# Patient Record
Sex: FEMALE | Race: WHITE | Employment: OTHER | URBAN - METROPOLITAN AREA
[De-identification: names, ages, dates, MRNs, and addresses within clinical notes are randomized per-mention and may not be internally consistent; named-entity substitution may affect disease eponyms.]

---

## 2018-02-12 ENCOUNTER — OFFICE VISIT (OUTPATIENT)
Dept: FAMILY MEDICINE CLINIC | Facility: CLINIC | Age: 63
End: 2018-02-12

## 2018-02-12 VITALS
TEMPERATURE: 98 F | SYSTOLIC BLOOD PRESSURE: 122 MMHG | BODY MASS INDEX: 39.54 KG/M2 | DIASTOLIC BLOOD PRESSURE: 80 MMHG | HEIGHT: 66.5 IN | HEART RATE: 64 BPM | WEIGHT: 249 LBS | RESPIRATION RATE: 16 BRPM | OXYGEN SATURATION: 98 %

## 2018-02-12 DIAGNOSIS — Z12.11 COLON CANCER SCREENING: ICD-10-CM

## 2018-02-12 DIAGNOSIS — K57.92 DIVERTICULITIS: ICD-10-CM

## 2018-02-12 DIAGNOSIS — E66.09 CLASS 2 OBESITY DUE TO EXCESS CALORIES WITHOUT SERIOUS COMORBIDITY WITH BODY MASS INDEX (BMI) OF 39.0 TO 39.9 IN ADULT: ICD-10-CM

## 2018-02-12 DIAGNOSIS — Z13.29 SCREENING FOR ENDOCRINE, NUTRITIONAL, METABOLIC AND IMMUNITY DISORDER: ICD-10-CM

## 2018-02-12 DIAGNOSIS — M25.561 CHRONIC PAIN OF RIGHT KNEE: ICD-10-CM

## 2018-02-12 DIAGNOSIS — M41.9 SCOLIOSIS, UNSPECIFIED SCOLIOSIS TYPE, UNSPECIFIED SPINAL REGION: Primary | ICD-10-CM

## 2018-02-12 DIAGNOSIS — Z13.21 SCREENING FOR ENDOCRINE, NUTRITIONAL, METABOLIC AND IMMUNITY DISORDER: ICD-10-CM

## 2018-02-12 DIAGNOSIS — F43.10 PTSD (POST-TRAUMATIC STRESS DISORDER): ICD-10-CM

## 2018-02-12 DIAGNOSIS — Z13.228 SCREENING FOR ENDOCRINE, NUTRITIONAL, METABOLIC AND IMMUNITY DISORDER: ICD-10-CM

## 2018-02-12 DIAGNOSIS — G89.29 CHRONIC PAIN OF RIGHT KNEE: ICD-10-CM

## 2018-02-12 DIAGNOSIS — Z13.0 SCREENING FOR ENDOCRINE, NUTRITIONAL, METABOLIC AND IMMUNITY DISORDER: ICD-10-CM

## 2018-02-12 PROCEDURE — 99204 OFFICE O/P NEW MOD 45 MIN: CPT | Performed by: FAMILY MEDICINE

## 2018-02-12 NOTE — PATIENT INSTRUCTIONS
[address]  [facility] [address]  provides outpatient community mental health services for

## 2018-02-12 NOTE — PROGRESS NOTES
[facility] Family Medicine Office Note  Chief Complaint:   Patient presents with:  Establish Care: NP estab care, needs referral for ortho and gastro       HPI:   This is a 65year old female coming in for  HPI  1.  Scoliosis  Was working with PT. OF SYSTEMS:   Review of Systems   Constitutional: Negative for chills and fever. HENT: Negative for rhinorrhea and sinus pressure. Eyes: Negative for pain and visual disturbance. Respiratory: Negative for cough and shortness of breath.     Cardiovasc no swelling and no effusion. Thoracic back: She exhibits pain. She exhibits normal range of motion. Deformity: lateral curvature to right. Lymphadenopathy:     She has no cervical adenopathy.    Neurological: She is alert and oriented to person, pl Scoliosis     Diverticulitis     PTSD (post-traumatic stress disorder)     Chronic pain of right knee     Class 2 obesity due to excess calories without serious comorbidity with body mass index (BMI) of 39.0 to 39.9 in adult

## 2018-02-14 ENCOUNTER — TELEPHONE (OUTPATIENT)
Dept: FAMILY MEDICINE CLINIC | Facility: CLINIC | Age: 63
End: 2018-02-14

## 2018-05-30 ENCOUNTER — TELEPHONE (OUTPATIENT)
Dept: FAMILY MEDICINE CLINIC | Facility: CLINIC | Age: 63
End: 2018-05-30

## 2018-05-30 DIAGNOSIS — M41.9 SCOLIOSIS, UNSPECIFIED SCOLIOSIS TYPE, UNSPECIFIED SPINAL REGION: Primary | ICD-10-CM

## 2018-05-31 ENCOUNTER — TELEPHONE (OUTPATIENT)
Dept: PHYSICAL THERAPY | Age: 63
End: 2018-05-31

## 2018-06-04 ENCOUNTER — APPOINTMENT (OUTPATIENT)
Dept: PHYSICAL THERAPY | Age: 63
End: 2018-06-04
Attending: FAMILY MEDICINE

## 2018-06-06 ENCOUNTER — OFFICE VISIT (OUTPATIENT)
Dept: PHYSICAL THERAPY | Age: 63
End: 2018-06-06
Attending: PHYSICIAN ASSISTANT
Payer: MEDICAID

## 2018-06-06 PROCEDURE — 97162 PT EVAL MOD COMPLEX 30 MIN: CPT | Performed by: PHYSICAL THERAPIST

## 2018-06-06 PROCEDURE — 97530 THERAPEUTIC ACTIVITIES: CPT | Performed by: PHYSICAL THERAPIST

## 2018-06-19 ENCOUNTER — OFFICE VISIT (OUTPATIENT)
Dept: PHYSICAL THERAPY | Age: 63
End: 2018-06-19
Attending: PHYSICIAN ASSISTANT
Payer: MEDICAID

## 2018-06-19 PROCEDURE — 97140 MANUAL THERAPY 1/> REGIONS: CPT | Performed by: PHYSICAL THERAPIST

## 2018-06-19 PROCEDURE — 97110 THERAPEUTIC EXERCISES: CPT | Performed by: PHYSICAL THERAPIST

## 2018-06-19 NOTE — PROGRESS NOTES
Dx: Thoracic pain, scoliosis         Authorized # of Visits:  8 visits by 7/11/2018         Next MD visit: none scheduled  Fall Risk: standard         Precautions: n/a             Subjective: Pt states she has not noticed any significant changes in symptom MINS                                          Charges:  There ex X 2, man there X 1       Total Timed Treatment: 45 min  Total Treatment Time: 45 min

## 2018-06-26 ENCOUNTER — OFFICE VISIT (OUTPATIENT)
Dept: PHYSICAL THERAPY | Age: 63
End: 2018-06-26
Attending: PHYSICIAN ASSISTANT
Payer: MEDICAID

## 2018-06-26 PROCEDURE — 97110 THERAPEUTIC EXERCISES: CPT | Performed by: PHYSICAL THERAPIST

## 2018-06-26 PROCEDURE — 97140 MANUAL THERAPY 1/> REGIONS: CPT | Performed by: PHYSICAL THERAPIST

## 2018-06-26 NOTE — PROGRESS NOTES
Dx: Thoracic pain, scoliosis         Authorized # of Visits:  8 visits by 7/11/2018         Next MD visit: none scheduled  Fall Risk: standard         Precautions: n/a             Subjective: Pt states she has noticed significant improvement with back pain Charges:  There ex X 2, man there X 1       Total Timed Treatment: 45 min  Total Treatment Time: 45 min

## 2018-07-03 ENCOUNTER — OFFICE VISIT (OUTPATIENT)
Dept: PHYSICAL THERAPY | Age: 63
End: 2018-07-03
Attending: PHYSICIAN ASSISTANT
Payer: MEDICAID

## 2018-07-03 PROCEDURE — 97110 THERAPEUTIC EXERCISES: CPT

## 2018-07-03 PROCEDURE — 97140 MANUAL THERAPY 1/> REGIONS: CPT

## 2018-07-03 NOTE — PROGRESS NOTES
Dx: Thoracic pain, scoliosis         Authorized # of Visits:  8 visits by 7/11/2018         Next MD visit: none scheduled  Fall Risk: standard         Precautions: n/a             Subjective:  The patient reports she is feeling much better after attending t MAN THERE 15 MINS MAN THERE 15 MINS        STM to R parascapular mm STM to R parascapular mm Grade III T3-T4 L unilaterally 30x lateral glides T1-4                                   MINS                   MINS

## 2018-07-10 ENCOUNTER — APPOINTMENT (OUTPATIENT)
Dept: PHYSICAL THERAPY | Age: 63
End: 2018-07-10
Attending: PHYSICIAN ASSISTANT
Payer: MEDICAID

## 2018-07-16 ENCOUNTER — APPOINTMENT (OUTPATIENT)
Dept: PHYSICAL THERAPY | Age: 63
End: 2018-07-16
Attending: PHYSICIAN ASSISTANT
Payer: MEDICAID

## 2018-07-24 ENCOUNTER — APPOINTMENT (OUTPATIENT)
Dept: PHYSICAL THERAPY | Age: 63
End: 2018-07-24
Attending: PHYSICIAN ASSISTANT
Payer: MEDICAID

## 2018-07-31 ENCOUNTER — APPOINTMENT (OUTPATIENT)
Dept: PHYSICAL THERAPY | Age: 63
End: 2018-07-31
Attending: PHYSICIAN ASSISTANT
Payer: MEDICAID

## 2018-08-07 ENCOUNTER — APPOINTMENT (OUTPATIENT)
Dept: PHYSICAL THERAPY | Age: 63
End: 2018-08-07
Attending: PHYSICIAN ASSISTANT
Payer: MEDICAID

## 2018-11-21 NOTE — TELEPHONE ENCOUNTER
----- Message from Clif Bravo PTA sent at 5/30/2018  3:02 PM CDT -----  Regarding: Updated Order needed   Dr Bobby Downey    Your patient called to schedule PT for the orders you wrote in February . Her insurance requires updated current orders .     If
Order placed
generalized

## 2019-01-01 ENCOUNTER — EXTERNAL RECORD (OUTPATIENT)
Dept: HEALTH INFORMATION MANAGEMENT | Facility: OTHER | Age: 64
End: 2019-01-01

## 2019-01-07 ENCOUNTER — OFFICE VISIT (OUTPATIENT)
Dept: FAMILY MEDICINE CLINIC | Facility: CLINIC | Age: 64
End: 2019-01-07
Payer: MEDICAID

## 2019-01-07 VITALS
TEMPERATURE: 98 F | SYSTOLIC BLOOD PRESSURE: 142 MMHG | HEIGHT: 66.5 IN | WEIGHT: 264 LBS | RESPIRATION RATE: 16 BRPM | HEART RATE: 87 BPM | BODY MASS INDEX: 41.93 KG/M2 | OXYGEN SATURATION: 97 % | DIASTOLIC BLOOD PRESSURE: 90 MMHG

## 2019-01-07 DIAGNOSIS — R06.02 SHORTNESS OF BREATH: ICD-10-CM

## 2019-01-07 DIAGNOSIS — R53.83 OTHER FATIGUE: ICD-10-CM

## 2019-01-07 DIAGNOSIS — R05.9 COUGH: Primary | ICD-10-CM

## 2019-01-07 PROCEDURE — 99214 OFFICE O/P EST MOD 30 MIN: CPT | Performed by: FAMILY MEDICINE

## 2019-01-07 NOTE — PROGRESS NOTES
[address] [facility] Family Medicine Office Note  Chief Complaint:   Patient presents with:  ER F/U: [facility] 01/01/19, f/u exposure to mold, dizziness, poor appetite, fatigue      HPI:   This is a 59year old female coming in for  HPI    The patient pres Not Answered       REVIEW OF SYSTEMS:   Review of Systems   Constitutional: Positive for appetite change and fatigue. Negative for chills and fever. HENT: Negative for rhinorrhea and sinus pressure. Eyes: Negative for pain and visual disturbance.    Re exposure  Start with sputum culture  May need CT chest to eval for fungal infection or persistent sx  Recommend patient not return to apartment until suspicious material can be tested  Should contact [facility] dept for further instructions on testing o

## 2019-01-08 ENCOUNTER — TELEPHONE (OUTPATIENT)
Dept: FAMILY MEDICINE CLINIC | Facility: CLINIC | Age: 64
End: 2019-01-08

## 2019-01-08 NOTE — TELEPHONE ENCOUNTER
Patient signed medical records authorization form for the below Facility to disclose health information to [facility]:      Facility / Provider Name: [facility] Phone:    Facility Fax: [phone]     JET sent to scanning.  Fax confirmat

## 2019-01-09 ENCOUNTER — TELEPHONE (OUTPATIENT)
Dept: FAMILY MEDICINE CLINIC | Facility: CLINIC | Age: 64
End: 2019-01-09

## 2019-01-09 NOTE — TELEPHONE ENCOUNTER
Yesterday the patient bought high performance air purifier and ran it for 30 minutes and is amazed. No burning in mouth, coughing stopped. Horrible feeling patient had has now completely stopped.  Last night was the first time the patient slept without

## 2019-01-09 NOTE — TELEPHONE ENCOUNTER
Does not need to get CT scan unless sx worsen/return  If there is no sputum - no culture can be obtained - ok to cancel

## 2019-01-09 NOTE — TELEPHONE ENCOUNTER
Pt repeated all of the info below. Pt is using a [employer] high performance air purifier that has 2 filters-a HEPA filter and a ionizer. Also states she has no sputum to cough up at this time so she will not be able to do the sputum culture. Feeling so much

## 2019-01-11 ENCOUNTER — MED REC SCAN ONLY (OUTPATIENT)
Dept: FAMILY MEDICINE CLINIC | Facility: CLINIC | Age: 64
End: 2019-01-11

## 2019-01-14 ENCOUNTER — TELEPHONE (OUTPATIENT)
Dept: FAMILY MEDICINE CLINIC | Facility: CLINIC | Age: 64
End: 2019-01-14

## 2019-01-14 NOTE — TELEPHONE ENCOUNTER
The CT scan was denied, however, pt didn't want to complete it anyway because her symptoms improved with the air purifier.

## 2019-01-14 NOTE — TELEPHONE ENCOUNTER
Below message received from referral dept regarding patient's CT chest:    [facility] Clinical Staff             Good morning,     Per fax vd CPT code 85007 did not meet medical necessity & has been denied, Physician can call [phone]

## 2019-01-17 RX ORDER — ALBUTEROL SULFATE 90 UG/1
1 AEROSOL, METERED RESPIRATORY (INHALATION) EVERY 6 HOURS PRN
Qty: 1 INHALER | Refills: 1 | Status: CANCELLED | OUTPATIENT
Start: 2019-01-17

## 2019-01-21 ENCOUNTER — TELEPHONE (OUTPATIENT)
Dept: FAMILY MEDICINE CLINIC | Facility: CLINIC | Age: 64
End: 2019-01-21
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
## 2019-01-21 NOTE — TELEPHONE ENCOUNTER
Pt aware of Dr Verenice Sims response. She states she thinks she has a bacterial infection. She discribes it as a stye but states she has had one before and its not the same. She states it keeps popping up in different spots/eyes.   I made an appt for her for We

## 2019-01-21 NOTE — TELEPHONE ENCOUNTER
Albuterol HFA prn, mucinex for chest congestion. Daily women's multivitamin.  If mold is treated - sx should resolve
Called to clarify the treatment of the mold. Pt states the e-mail confirms there is mold but it did not states \"black mold\" or specify what type of mold. The housing authority will be moving her out to repair/paint/caulk the area but pt is unsure when.
Patient called to let us know that her apartment was tested and it tested positive for black mold. The patient stated she has bad mind fog and exhausted and tired all the time.  The patient is wondering if there is something she should be doing to radha relie
See 1/21/19 TE.
Spoke to pt about recommendations below. She verbalizes understanding, but does not want the albuterol inhaler. She states she is supposed to be moved out of her housing, while the mold is repaired.  She has a 24 hour air purifier and in the meantime has be
70

## 2019-01-21 NOTE — TELEPHONE ENCOUNTER
I don't know what is wrong with her eye - discharge?    For irritation - otc eye wetting drops like visine  Warm compress

## 2019-01-23 ENCOUNTER — OFFICE VISIT (OUTPATIENT)
Dept: FAMILY MEDICINE CLINIC | Facility: CLINIC | Age: 64
End: 2019-01-23
Payer: MEDICAID

## 2019-01-23 VITALS
DIASTOLIC BLOOD PRESSURE: 86 MMHG | RESPIRATION RATE: 16 BRPM | SYSTOLIC BLOOD PRESSURE: 142 MMHG | HEART RATE: 78 BPM | HEIGHT: 66.5 IN | TEMPERATURE: 98 F | OXYGEN SATURATION: 97 % | BODY MASS INDEX: 41.93 KG/M2 | WEIGHT: 264 LBS

## 2019-01-23 DIAGNOSIS — H00.011 HORDEOLUM EXTERNUM OF RIGHT UPPER EYELID: Primary | ICD-10-CM

## 2019-01-23 PROCEDURE — 99213 OFFICE O/P EST LOW 20 MIN: CPT | Performed by: FAMILY MEDICINE

## 2019-01-23 RX ORDER — ERYTHROMYCIN 5 MG/G
1 OINTMENT OPHTHALMIC EVERY 6 HOURS
Qty: 3.5 G | Refills: 0 | Status: SHIPPED | OUTPATIENT
Start: 2019-01-23 | End: 2019-01-23

## 2019-01-23 RX ORDER — ERYTHROMYCIN 5 MG/G
1 OINTMENT OPHTHALMIC EVERY 6 HOURS
Qty: 3.5 G | Refills: 0 | Status: SHIPPED | OUTPATIENT
Start: 2019-01-23 | End: 2019-01-30

## 2019-01-23 NOTE — PROGRESS NOTES
[facility] Family Medicine Office Note  Chief Complaint:   Patient presents with:  Eyelid Swelling: x1 week, right eye, white pus/redness      HPI:   This is a 59year old female coming in for  Eye Problem    The right eye is affected.  This is a Application into the right eye every 6 (six) hours for 7 days. Disp: 3.5 g Rfl: 0      Counseling given: Not Answered       REVIEW OF SYSTEMS:   Review of Systems   Constitutional: Negative for chills and fever.    HENT: Negative for rhinorrhea and sinus pr worsen or fail to improve. Meds & Refills for this Visit:  Requested Prescriptions     Signed Prescriptions Disp Refills   • erythromycin 5 MG/GM Ophthalmic Ointment 3.5 g 0     Sig: Place 1 Application into the right eye every 6 (six) hours for 7 days.

## 2019-01-23 NOTE — PATIENT INSTRUCTIONS
Sty (or Stye)  A sty is an infection of the oil gland of the eyelid. It may develop into a small pocket of pus (an abscess). This can cause pain, redness, and swelling.  In early stages, a sty is treated with antibiotic cream, eye drops, or a small towel © 4473-1714 [employer] [address] [facility], [facility] [place]. All rights reserved. This information is not intended as a substitute for professional medical care. Always follow your healthcare professional's instructions.

## 2019-02-04 ENCOUNTER — PATIENT OUTREACH (OUTPATIENT)
Dept: FAMILY MEDICINE CLINIC | Facility: CLINIC | Age: 64
End: 2019-02-04

## 2019-02-05 ENCOUNTER — TELEPHONE (OUTPATIENT)
Dept: FAMILY MEDICINE CLINIC | Facility: CLINIC | Age: 64
End: 2019-02-05

## 2019-02-05 NOTE — TELEPHONE ENCOUNTER
Patient Mike Mendoza called to let provider know that she moved to a different apartment and her eye infection and scratchy throat and other allergy type issues seem to be resolving, she believes there was mold in the old apartment that was causing her problems.

## 2019-02-15 ENCOUNTER — OFFICE VISIT (OUTPATIENT)
Dept: FAMILY MEDICINE CLINIC | Facility: CLINIC | Age: 64
End: 2019-02-15
Payer: MEDICAID

## 2019-02-15 VITALS
SYSTOLIC BLOOD PRESSURE: 130 MMHG | WEIGHT: 264 LBS | OXYGEN SATURATION: 97 % | DIASTOLIC BLOOD PRESSURE: 86 MMHG | HEART RATE: 63 BPM | HEIGHT: 66.5 IN | RESPIRATION RATE: 16 BRPM | BODY MASS INDEX: 41.93 KG/M2 | TEMPERATURE: 98 F

## 2019-02-15 DIAGNOSIS — H01.02A SQUAMOUS BLEPHARITIS OF BOTH UPPER AND LOWER EYELID OF RIGHT EYE: ICD-10-CM

## 2019-02-15 DIAGNOSIS — H57.89 IRRITATION OF RIGHT EYE: Primary | ICD-10-CM

## 2019-02-15 PROCEDURE — 99213 OFFICE O/P EST LOW 20 MIN: CPT | Performed by: FAMILY MEDICINE

## 2019-02-15 NOTE — PROGRESS NOTES
[address] [facility] Family Medicine Office Note  Chief Complaint:   Patient presents with:  Eyelid Swelling: f/u, bump is still current on the right eye, no discharge      HPI:   This is a 59year old female coming in for  HPI    Eye concern  Has irritati SpO2 97%   BMI 41.97 kg/m²  Estimated body mass index is 41.97 kg/m² as calculated from the following:    Height as of this encounter: 66.5\". Weight as of this encounter: 264 lb. Vital signs reviewed. Appears stated age, well groomed.   Physical Exam knee     Class 2 obesity due to excess calories without serious comorbidity with body mass index (BMI) of 39.0 to 39.9 in adult

## 2019-02-15 NOTE — PATIENT INSTRUCTIONS
Treating Blepharitis: Self-Care    To treat the problem, keep your eyelids clean. Warm compresses can reduce redness and swelling, and help clean your eyelids, too. You may also need to wash the area gently with an eyelid scrub when you wake up.   To appl

## 2019-03-28 NOTE — TELEPHONE ENCOUNTER
Patient left a message requesting a refill for erythromycin 5 MG/GM Ophthalmic Ointment. Patient said she keeps getting eye infections from her allergies.  Patient said she would like this get refilled at [facility] in [place] on [address].

## 2019-03-28 NOTE — TELEPHONE ENCOUNTER
Spoke to pt - she states that her left eye had swelling, mucous, and was watery. She states it's from her environmental allergies/pollen.  She states she had used the Erythromycin 5mg/gm ophthalmic ointment from her 1/23/19 visit (tx of R eye irritation) an

## 2019-03-29 RX ORDER — ERYTHROMYCIN 5 MG/G
1 OINTMENT OPHTHALMIC EVERY 6 HOURS
Qty: 3.5 G | Refills: 0 | OUTPATIENT
Start: 2019-03-29 | End: 2019-04-05

## 2019-03-30 NOTE — TELEPHONE ENCOUNTER
Allergic conjunctivitis not treated with abx - but need to consider pataday opth gtt or oral antihistamine

## 2019-04-01 NOTE — TELEPHONE ENCOUNTER
Pt informed. I offered an appt, offered to send Pataday but stated it may not be covered. Pt given a otc option-[place] eye drops and instructed to use a oral antihistamine. Call if no better. Dr Leandro Bartlett was informed and is ok with the plan.

## 2019-11-22 ENCOUNTER — TELEPHONE (OUTPATIENT)
Dept: SCHEDULING | Age: 64
End: 2019-11-22

## 2019-12-04 ENCOUNTER — TELEPHONE (OUTPATIENT)
Dept: FAMILY MEDICINE CLINIC | Facility: CLINIC | Age: 64
End: 2019-12-04

## 2020-01-01 ENCOUNTER — EXTERNAL RECORD (OUTPATIENT)
Dept: HEALTH INFORMATION MANAGEMENT | Facility: OTHER | Age: 65
End: 2020-01-01

## 2020-02-19 ENCOUNTER — TELEPHONE (OUTPATIENT)
Dept: SCHEDULING | Age: 65
End: 2020-02-19

## 2020-02-19 ENCOUNTER — TELEPHONE (OUTPATIENT)
Dept: FAMILY MEDICINE | Age: 65
End: 2020-02-19

## 2020-02-20 ENCOUNTER — TELEPHONE (OUTPATIENT)
Dept: SCHEDULING | Age: 65
End: 2020-02-20

## 2020-02-24 ENCOUNTER — APPOINTMENT (OUTPATIENT)
Dept: FAMILY MEDICINE | Age: 65
End: 2020-02-24

## 2020-02-27 ENCOUNTER — APPOINTMENT (OUTPATIENT)
Dept: FAMILY MEDICINE | Age: 65
End: 2020-02-27

## 2020-02-29 ENCOUNTER — TELEPHONE (OUTPATIENT)
Dept: SCHEDULING | Age: 65
End: 2020-02-29

## 2020-03-05 ENCOUNTER — APPOINTMENT (OUTPATIENT)
Dept: FAMILY MEDICINE | Age: 65
End: 2020-03-05

## 2020-06-23 ENCOUNTER — TELEPHONE (OUTPATIENT)
Dept: SCHEDULING | Age: 65
End: 2020-06-23

## 2020-06-24 ENCOUNTER — TELEPHONE (OUTPATIENT)
Dept: SCHEDULING | Age: 65
End: 2020-06-24

## 2020-06-26 ENCOUNTER — APPOINTMENT (OUTPATIENT)
Dept: FAMILY MEDICINE | Age: 65
End: 2020-06-26

## 2020-08-07 ENCOUNTER — TELEPHONE (OUTPATIENT)
Dept: SCHEDULING | Age: 65
End: 2020-08-07

## 2020-08-07 ENCOUNTER — WALK IN (OUTPATIENT)
Dept: URGENT CARE | Age: 65
End: 2020-08-07

## 2020-08-07 VITALS
HEART RATE: 74 BPM | SYSTOLIC BLOOD PRESSURE: 132 MMHG | DIASTOLIC BLOOD PRESSURE: 84 MMHG | BODY MASS INDEX: 34.1 KG/M2 | TEMPERATURE: 98.1 F | WEIGHT: 225 LBS | RESPIRATION RATE: 15 BRPM | HEIGHT: 68 IN

## 2020-08-07 DIAGNOSIS — J30.2 SEASONAL ALLERGIES: ICD-10-CM

## 2020-08-07 DIAGNOSIS — H93.12 TINNITUS OF LEFT EAR: Primary | ICD-10-CM

## 2020-08-07 PROCEDURE — 99213 OFFICE O/P EST LOW 20 MIN: CPT | Performed by: NURSE PRACTITIONER

## 2020-08-07 SDOH — HEALTH STABILITY: MENTAL HEALTH: HOW OFTEN DO YOU HAVE A DRINK CONTAINING ALCOHOL?: NEVER

## 2020-08-07 ASSESSMENT — ENCOUNTER SYMPTOMS
COUGH: 0
ENDOCRINE NEGATIVE: 1
CONSTITUTIONAL NEGATIVE: 1
EYES NEGATIVE: 1
ABDOMINAL PAIN: 0
NUMBNESS: 0
PSYCHIATRIC NEGATIVE: 1
WEAKNESS: 0
CHILLS: 0
DIZZINESS: 0
HEADACHES: 0
NAUSEA: 0
SINUS PAIN: 0
RESPIRATORY NEGATIVE: 1
APPETITE CHANGE: 0
VOMITING: 0
SORE THROAT: 0
SINUS PRESSURE: 0
FEVER: 0
FATIGUE: 0
CONSTIPATION: 0
SHORTNESS OF BREATH: 0
LIGHT-HEADEDNESS: 0
RHINORRHEA: 0
CHEST TIGHTNESS: 0
GASTROINTESTINAL NEGATIVE: 1
WHEEZING: 0
DIARRHEA: 0
HEMATOLOGIC/LYMPHATIC NEGATIVE: 1

## 2020-10-16 ENCOUNTER — E-ADVICE (OUTPATIENT)
Dept: FAMILY MEDICINE | Age: 65
End: 2020-10-16

## 2021-04-12 ENCOUNTER — TELEPHONE (OUTPATIENT)
Dept: FAMILY MEDICINE CLINIC | Facility: CLINIC | Age: 66
End: 2021-04-12

## 2021-04-20 ENCOUNTER — TELEPHONE (OUTPATIENT)
Dept: SCHEDULING | Age: 66
End: 2021-04-20

## 2022-01-27 DIAGNOSIS — E87.5 HYPERKALEMIA: Primary | ICD-10-CM

## 2022-01-27 PROBLEM — E10.9 TYPE 1 DIABETES MELLITUS (CMD): Status: ACTIVE | Noted: 2022-01-27

## 2022-01-27 PROBLEM — E16.2 HYPOGLYCEMIA: Status: ACTIVE | Noted: 2022-01-27

## 2022-01-27 PROBLEM — I25.10 CAD (CORONARY ARTERY DISEASE): Status: ACTIVE | Noted: 2022-01-27

## 2022-01-27 PROBLEM — M54.2 NECK PAIN: Status: ACTIVE | Noted: 2022-01-27

## 2022-03-25 ENCOUNTER — TELEPHONE (OUTPATIENT)
Dept: SCHEDULING | Age: 67
End: 2022-03-25

## 2022-04-11 ENCOUNTER — TELEPHONE (OUTPATIENT)
Dept: SCHEDULING | Age: 67
End: 2022-04-11

## 2022-04-11 ENCOUNTER — EXTERNAL RECORD (OUTPATIENT)
Dept: HEALTH INFORMATION MANAGEMENT | Facility: OTHER | Age: 67
End: 2022-04-11

## 2022-04-18 ENCOUNTER — TELEPHONE (OUTPATIENT)
Dept: SCHEDULING | Age: 67
End: 2022-04-18

## 2022-04-18 ENCOUNTER — APPOINTMENT (OUTPATIENT)
Dept: FAMILY MEDICINE | Age: 67
End: 2022-04-18

## 2022-04-20 ENCOUNTER — OFFICE VISIT (OUTPATIENT)
Dept: FAMILY MEDICINE | Age: 67
End: 2022-04-20

## 2022-04-20 ENCOUNTER — TELEPHONE (OUTPATIENT)
Dept: SCHEDULING | Age: 67
End: 2022-04-20

## 2022-04-20 VITALS
SYSTOLIC BLOOD PRESSURE: 140 MMHG | DIASTOLIC BLOOD PRESSURE: 84 MMHG | HEART RATE: 75 BPM | TEMPERATURE: 97.6 F | OXYGEN SATURATION: 96 % | BODY MASS INDEX: 36.54 KG/M2 | WEIGHT: 232.8 LBS | RESPIRATION RATE: 16 BRPM | HEIGHT: 67 IN

## 2022-04-20 DIAGNOSIS — Z86.39 HISTORY OF HYPERGLYCEMIA: ICD-10-CM

## 2022-04-20 DIAGNOSIS — F41.1 GENERALIZED ANXIETY DISORDER: ICD-10-CM

## 2022-04-20 DIAGNOSIS — E66.09 CLASS 2 OBESITY DUE TO EXCESS CALORIES WITHOUT SERIOUS COMORBIDITY WITH BODY MASS INDEX (BMI) OF 39.0 TO 39.9 IN ADULT: ICD-10-CM

## 2022-04-20 DIAGNOSIS — Z13.1 SCREENING FOR DIABETES MELLITUS (DM): ICD-10-CM

## 2022-04-20 DIAGNOSIS — I10 ESSENTIAL HYPERTENSION: ICD-10-CM

## 2022-04-20 DIAGNOSIS — F43.10 PTSD (POST-TRAUMATIC STRESS DISORDER): Primary | ICD-10-CM

## 2022-04-20 DIAGNOSIS — E66.01 SEVERE OBESITY (BMI 35.0-39.9) WITH COMORBIDITY (CMD): ICD-10-CM

## 2022-04-20 DIAGNOSIS — H25.13 AGE-RELATED NUCLEAR CATARACT, BILATERAL: ICD-10-CM

## 2022-04-20 DIAGNOSIS — M41.24 OTHER IDIOPATHIC SCOLIOSIS, THORACIC REGION: ICD-10-CM

## 2022-04-20 PROBLEM — E66.812 CLASS 2 OBESITY DUE TO EXCESS CALORIES WITHOUT SERIOUS COMORBIDITY WITH BODY MASS INDEX (BMI) OF 39.0 TO 39.9 IN ADULT: Status: ACTIVE | Noted: 2018-02-12

## 2022-04-20 PROBLEM — H52.13 MYOPIA OF BOTH EYES: Status: ACTIVE | Noted: 2022-04-20

## 2022-04-20 PROBLEM — M41.9 SCOLIOSIS: Status: ACTIVE | Noted: 2018-02-12

## 2022-04-20 PROBLEM — H52.4 PRESBYOPIA: Status: ACTIVE | Noted: 2022-04-20

## 2022-04-20 PROCEDURE — 99204 OFFICE O/P NEW MOD 45 MIN: CPT | Performed by: FAMILY MEDICINE

## 2022-04-20 RX ORDER — LOSARTAN POTASSIUM 25 MG/1
25 TABLET ORAL DAILY
Qty: 30 TABLET | Refills: 1 | Status: SHIPPED | OUTPATIENT
Start: 2022-04-20 | End: 2022-06-10 | Stop reason: SDUPTHER

## 2022-04-20 ASSESSMENT — PATIENT HEALTH QUESTIONNAIRE - PHQ9
SUM OF ALL RESPONSES TO PHQ9 QUESTIONS 1 AND 2: 0
1. LITTLE INTEREST OR PLEASURE IN DOING THINGS: NOT AT ALL
CLINICAL INTERPRETATION OF PHQ2 SCORE: NO FURTHER SCREENING NEEDED
2. FEELING DOWN, DEPRESSED OR HOPELESS: NOT AT ALL
SUM OF ALL RESPONSES TO PHQ9 QUESTIONS 1 AND 2: 0

## 2022-05-24 ENCOUNTER — TELEPHONE (OUTPATIENT)
Dept: SCHEDULING | Age: 67
End: 2022-05-24

## 2022-05-27 ENCOUNTER — APPOINTMENT (OUTPATIENT)
Dept: FAMILY MEDICINE | Age: 67
End: 2022-05-27

## 2022-05-27 ENCOUNTER — TELEPHONE (OUTPATIENT)
Dept: SCHEDULING | Age: 67
End: 2022-05-27

## 2022-05-31 ENCOUNTER — TELEPHONE (OUTPATIENT)
Dept: SCHEDULING | Age: 67
End: 2022-05-31

## 2022-06-02 ENCOUNTER — LAB SERVICES (OUTPATIENT)
Dept: FAMILY MEDICINE | Age: 67
End: 2022-06-02

## 2022-06-02 DIAGNOSIS — Z86.39 HISTORY OF HYPERGLYCEMIA: ICD-10-CM

## 2022-06-02 DIAGNOSIS — Z13.1 SCREENING FOR DIABETES MELLITUS (DM): ICD-10-CM

## 2022-06-02 DIAGNOSIS — I10 ESSENTIAL HYPERTENSION: ICD-10-CM

## 2022-06-02 PROCEDURE — 80053 COMPREHEN METABOLIC PANEL: CPT | Performed by: CLINICAL MEDICAL LABORATORY

## 2022-06-02 PROCEDURE — 80061 LIPID PANEL: CPT | Performed by: CLINICAL MEDICAL LABORATORY

## 2022-06-02 PROCEDURE — 36415 COLL VENOUS BLD VENIPUNCTURE: CPT | Performed by: FAMILY MEDICINE

## 2022-06-02 PROCEDURE — 83036 HEMOGLOBIN GLYCOSYLATED A1C: CPT | Performed by: CLINICAL MEDICAL LABORATORY

## 2022-06-02 PROCEDURE — 85025 COMPLETE CBC W/AUTO DIFF WBC: CPT | Performed by: CLINICAL MEDICAL LABORATORY

## 2022-06-02 PROCEDURE — 84443 ASSAY THYROID STIM HORMONE: CPT | Performed by: CLINICAL MEDICAL LABORATORY

## 2022-06-03 ENCOUNTER — TELEPHONE (OUTPATIENT)
Dept: SCHEDULING | Age: 67
End: 2022-06-03

## 2022-06-03 LAB
ALBUMIN SERPL-MCNC: 3.7 G/DL (ref 3.6–5.1)
ALBUMIN/GLOB SERPL: 1.2 {RATIO} (ref 1–2.4)
ALP SERPL-CCNC: 119 UNITS/L (ref 45–117)
ALT SERPL-CCNC: 17 UNITS/L
ANION GAP SERPL CALC-SCNC: 10 MMOL/L (ref 7–19)
AST SERPL-CCNC: 13 UNITS/L
BASOPHILS # BLD: 0.1 K/MCL (ref 0–0.3)
BASOPHILS NFR BLD: 1 %
BILIRUB SERPL-MCNC: 0.3 MG/DL (ref 0.2–1)
BUN SERPL-MCNC: 14 MG/DL (ref 6–20)
BUN/CREAT SERPL: 22 (ref 7–25)
CALCIUM SERPL-MCNC: 10.3 MG/DL (ref 8.4–10.2)
CHLORIDE SERPL-SCNC: 112 MMOL/L (ref 97–110)
CHOLEST SERPL-MCNC: 245 MG/DL
CHOLEST/HDLC SERPL: 4.3 {RATIO}
CO2 SERPL-SCNC: 23 MMOL/L (ref 21–32)
CREAT SERPL-MCNC: 0.65 MG/DL (ref 0.51–0.95)
DEPRECATED RDW RBC: 43.5 FL (ref 39–50)
EOSINOPHIL # BLD: 0.2 K/MCL (ref 0–0.5)
EOSINOPHIL NFR BLD: 3 %
ERYTHROCYTE [DISTWIDTH] IN BLOOD: 12.6 % (ref 11–15)
FASTING DURATION TIME PATIENT: 12 HOURS (ref 0–999)
FASTING DURATION TIME PATIENT: ABNORMAL H
GFR SERPLBLD BASED ON 1.73 SQ M-ARVRAT: >90 ML/MIN
GLOBULIN SER-MCNC: 3 G/DL (ref 2–4)
GLUCOSE SERPL-MCNC: 106 MG/DL (ref 70–99)
HBA1C MFR BLD: 5.4 % (ref 4.5–5.6)
HCT VFR BLD CALC: 43.1 % (ref 36–46.5)
HDLC SERPL-MCNC: 57 MG/DL
HGB BLD-MCNC: 14.7 G/DL (ref 12–15.5)
IMM GRANULOCYTES # BLD AUTO: 0 K/MCL (ref 0–0.2)
IMM GRANULOCYTES # BLD: 0 %
LDLC SERPL CALC-MCNC: 172 MG/DL
LYMPHOCYTES # BLD: 2.2 K/MCL (ref 1–4)
LYMPHOCYTES NFR BLD: 32 %
MCH RBC QN AUTO: 32 PG (ref 26–34)
MCHC RBC AUTO-ENTMCNC: 34.1 G/DL (ref 32–36.5)
MCV RBC AUTO: 93.7 FL (ref 78–100)
MONOCYTES # BLD: 0.6 K/MCL (ref 0.3–0.9)
MONOCYTES NFR BLD: 8 %
NEUTROPHILS # BLD: 3.9 K/MCL (ref 1.8–7.7)
NEUTROPHILS NFR BLD: 56 %
NONHDLC SERPL-MCNC: 188 MG/DL
NRBC BLD MANUAL-RTO: 0 /100 WBC
PLATELET # BLD AUTO: 172 K/MCL (ref 140–450)
POTASSIUM SERPL-SCNC: 4.4 MMOL/L (ref 3.4–5.1)
PROT SERPL-MCNC: 6.7 G/DL (ref 6.4–8.2)
RBC # BLD: 4.6 MIL/MCL (ref 4–5.2)
SODIUM SERPL-SCNC: 141 MMOL/L (ref 135–145)
TRIGL SERPL-MCNC: 81 MG/DL
TSH SERPL-ACNC: 1.35 MCUNITS/ML (ref 0.35–5)
WBC # BLD: 7 K/MCL (ref 4.2–11)

## 2022-06-10 ENCOUNTER — OFFICE VISIT (OUTPATIENT)
Dept: FAMILY MEDICINE | Age: 67
End: 2022-06-10

## 2022-06-10 VITALS
OXYGEN SATURATION: 96 % | HEART RATE: 74 BPM | SYSTOLIC BLOOD PRESSURE: 160 MMHG | RESPIRATION RATE: 16 BRPM | DIASTOLIC BLOOD PRESSURE: 92 MMHG | TEMPERATURE: 97.9 F | BODY MASS INDEX: 36.76 KG/M2 | HEIGHT: 67 IN

## 2022-06-10 DIAGNOSIS — F43.10 PTSD (POST-TRAUMATIC STRESS DISORDER): Primary | ICD-10-CM

## 2022-06-10 DIAGNOSIS — I10 ESSENTIAL HYPERTENSION: ICD-10-CM

## 2022-06-10 PROCEDURE — 99213 OFFICE O/P EST LOW 20 MIN: CPT | Performed by: FAMILY MEDICINE

## 2022-06-10 RX ORDER — LOSARTAN POTASSIUM 50 MG/1
50 TABLET ORAL DAILY
Qty: 30 TABLET | Refills: 0 | Status: SHIPPED | OUTPATIENT
Start: 2022-06-10 | End: 2022-07-05 | Stop reason: SDUPTHER

## 2022-06-10 ASSESSMENT — PATIENT HEALTH QUESTIONNAIRE - PHQ9
CLINICAL INTERPRETATION OF PHQ2 SCORE: NO FURTHER SCREENING NEEDED
SUM OF ALL RESPONSES TO PHQ9 QUESTIONS 1 AND 2: 0
SUM OF ALL RESPONSES TO PHQ9 QUESTIONS 1 AND 2: 0
1. LITTLE INTEREST OR PLEASURE IN DOING THINGS: NOT AT ALL
2. FEELING DOWN, DEPRESSED OR HOPELESS: NOT AT ALL

## 2022-06-14 DIAGNOSIS — I10 ESSENTIAL HYPERTENSION: ICD-10-CM

## 2022-06-14 RX ORDER — LOSARTAN POTASSIUM 25 MG/1
25 TABLET ORAL DAILY
Qty: 30 TABLET | Refills: 1 | OUTPATIENT
Start: 2022-06-14

## 2022-06-27 ENCOUNTER — TELEPHONE (OUTPATIENT)
Dept: FAMILY MEDICINE | Age: 67
End: 2022-06-27

## 2022-07-01 ENCOUNTER — TELEPHONE (OUTPATIENT)
Dept: SCHEDULING | Age: 67
End: 2022-07-01

## 2022-07-05 DIAGNOSIS — I10 ESSENTIAL HYPERTENSION: ICD-10-CM

## 2022-07-06 DIAGNOSIS — I10 ESSENTIAL HYPERTENSION: ICD-10-CM

## 2022-07-06 RX ORDER — LOSARTAN POTASSIUM 50 MG/1
50 TABLET ORAL DAILY
Qty: 90 TABLET | Refills: 0 | Status: SHIPPED | OUTPATIENT
Start: 2022-07-06 | End: 2022-07-07

## 2022-07-07 DIAGNOSIS — I10 ESSENTIAL HYPERTENSION: ICD-10-CM

## 2022-07-07 RX ORDER — LOSARTAN POTASSIUM 50 MG/1
50 TABLET ORAL DAILY
Qty: 90 TABLET | Refills: 0 | Status: SHIPPED | OUTPATIENT
Start: 2022-07-07 | End: 2022-07-20 | Stop reason: SDUPTHER

## 2022-07-07 RX ORDER — LOSARTAN POTASSIUM 50 MG/1
50 TABLET ORAL DAILY
Qty: 30 TABLET | Refills: 0 | Status: SHIPPED | OUTPATIENT
Start: 2022-07-07 | End: 2022-07-07

## 2022-07-20 ENCOUNTER — OFFICE VISIT (OUTPATIENT)
Dept: FAMILY MEDICINE | Age: 67
End: 2022-07-20

## 2022-07-20 VITALS
RESPIRATION RATE: 16 BRPM | OXYGEN SATURATION: 98 % | TEMPERATURE: 98.6 F | HEART RATE: 59 BPM | SYSTOLIC BLOOD PRESSURE: 154 MMHG | DIASTOLIC BLOOD PRESSURE: 82 MMHG

## 2022-07-20 DIAGNOSIS — E66.09 CLASS 2 OBESITY DUE TO EXCESS CALORIES WITHOUT SERIOUS COMORBIDITY WITH BODY MASS INDEX (BMI) OF 39.0 TO 39.9 IN ADULT: ICD-10-CM

## 2022-07-20 DIAGNOSIS — M41.24 OTHER IDIOPATHIC SCOLIOSIS, THORACIC REGION: ICD-10-CM

## 2022-07-20 DIAGNOSIS — Z12.31 VISIT FOR SCREENING MAMMOGRAM: ICD-10-CM

## 2022-07-20 DIAGNOSIS — F43.10 PTSD (POST-TRAUMATIC STRESS DISORDER): ICD-10-CM

## 2022-07-20 DIAGNOSIS — Z00.00 MEDICARE ANNUAL WELLNESS VISIT, INITIAL: Primary | ICD-10-CM

## 2022-07-20 DIAGNOSIS — F41.1 GENERALIZED ANXIETY DISORDER: ICD-10-CM

## 2022-07-20 DIAGNOSIS — I10 ESSENTIAL HYPERTENSION: ICD-10-CM

## 2022-07-20 PROBLEM — G89.29 CHRONIC PAIN OF RIGHT KNEE: Status: ACTIVE | Noted: 2018-02-12

## 2022-07-20 PROBLEM — M25.561 CHRONIC PAIN OF RIGHT KNEE: Status: ACTIVE | Noted: 2018-02-12

## 2022-07-20 PROBLEM — K57.92 DIVERTICULITIS: Status: ACTIVE | Noted: 2018-02-12

## 2022-07-20 PROCEDURE — 99214 OFFICE O/P EST MOD 30 MIN: CPT | Performed by: FAMILY MEDICINE

## 2022-07-20 PROCEDURE — G0438 PPPS, INITIAL VISIT: HCPCS | Performed by: FAMILY MEDICINE

## 2022-07-20 RX ORDER — LOSARTAN POTASSIUM 100 MG/1
100 TABLET ORAL DAILY
Qty: 90 TABLET | Refills: 1 | Status: SHIPPED | OUTPATIENT
Start: 2022-07-20 | End: 2022-10-17 | Stop reason: SDUPTHER

## 2022-07-20 ASSESSMENT — PATIENT HEALTH QUESTIONNAIRE - PHQ9
CLINICAL INTERPRETATION OF PHQ2 SCORE: NO FURTHER SCREENING NEEDED
SUM OF ALL RESPONSES TO PHQ9 QUESTIONS 1 AND 2: 1
SUM OF ALL RESPONSES TO PHQ9 QUESTIONS 1 AND 2: 1
2. FEELING DOWN, DEPRESSED OR HOPELESS: SEVERAL DAYS
1. LITTLE INTEREST OR PLEASURE IN DOING THINGS: NOT AT ALL

## 2022-07-20 ASSESSMENT — PAIN SCALES - GENERAL: PAINLEVEL: 6

## 2022-07-22 ENCOUNTER — TELEPHONE (OUTPATIENT)
Dept: SCHEDULING | Age: 67
End: 2022-07-22

## 2022-07-25 ENCOUNTER — TELEPHONE (OUTPATIENT)
Dept: SCHEDULING | Age: 67
End: 2022-07-25

## 2022-08-11 ENCOUNTER — TELEPHONE (OUTPATIENT)
Dept: SCHEDULING | Age: 67
End: 2022-08-11

## 2022-08-15 ENCOUNTER — HOSPITAL ENCOUNTER (OUTPATIENT)
Dept: MAMMOGRAPHY | Age: 67
End: 2022-08-15
Attending: FAMILY MEDICINE

## 2022-08-17 ENCOUNTER — HOSPITAL ENCOUNTER (OUTPATIENT)
Dept: MAMMOGRAPHY | Age: 67
Discharge: HOME | End: 2022-08-17
Attending: FAMILY MEDICINE

## 2022-08-17 DIAGNOSIS — Z12.31 VISIT FOR SCREENING MAMMOGRAM: ICD-10-CM

## 2022-08-17 PROCEDURE — 77067 SCR MAMMO BI INCL CAD: CPT

## 2022-09-20 ENCOUNTER — TELEPHONE (OUTPATIENT)
Dept: FAMILY MEDICINE | Age: 67
End: 2022-09-20

## 2022-10-01 DIAGNOSIS — I10 ESSENTIAL HYPERTENSION: ICD-10-CM

## 2022-10-03 RX ORDER — LOSARTAN POTASSIUM 50 MG/1
50 TABLET ORAL DAILY
Qty: 90 TABLET | Refills: 0 | Status: SHIPPED | OUTPATIENT
Start: 2022-10-03 | End: 2022-10-11

## 2022-10-11 DIAGNOSIS — I10 ESSENTIAL HYPERTENSION: ICD-10-CM

## 2022-10-11 RX ORDER — LOSARTAN POTASSIUM 50 MG/1
50 TABLET ORAL DAILY
Qty: 90 TABLET | Refills: 0 | Status: SHIPPED | OUTPATIENT
Start: 2022-10-11 | End: 2022-10-17 | Stop reason: ALTCHOICE

## 2022-10-12 ENCOUNTER — TELEPHONE (OUTPATIENT)
Dept: SCHEDULING | Age: 67
End: 2022-10-12

## 2022-10-12 DIAGNOSIS — I10 ESSENTIAL HYPERTENSION: ICD-10-CM

## 2022-10-17 RX ORDER — LOSARTAN POTASSIUM 100 MG/1
100 TABLET ORAL DAILY
Qty: 90 TABLET | Refills: 1 | Status: SHIPPED | OUTPATIENT
Start: 2022-10-17 | End: 2023-04-14 | Stop reason: SDUPTHER

## 2023-01-20 ENCOUNTER — TELEPHONE (OUTPATIENT)
Dept: SCHEDULING | Age: 68
End: 2023-01-20

## 2023-01-21 ENCOUNTER — WALK IN (OUTPATIENT)
Dept: URGENT CARE | Age: 68
End: 2023-01-21
Attending: FAMILY MEDICINE

## 2023-01-21 VITALS
RESPIRATION RATE: 19 BRPM | OXYGEN SATURATION: 97 % | DIASTOLIC BLOOD PRESSURE: 89 MMHG | HEART RATE: 75 BPM | SYSTOLIC BLOOD PRESSURE: 181 MMHG | TEMPERATURE: 98 F

## 2023-01-21 DIAGNOSIS — H10.9 CONJUNCTIVITIS OF RIGHT EYE, UNSPECIFIED CONJUNCTIVITIS TYPE: Primary | ICD-10-CM

## 2023-01-21 PROCEDURE — 99212 OFFICE O/P EST SF 10 MIN: CPT

## 2023-01-21 RX ORDER — ERYTHROMYCIN 5 MG/G
OINTMENT OPHTHALMIC EVERY 6 HOURS
Qty: 3.5 G | Refills: 0 | Status: SHIPPED | OUTPATIENT
Start: 2023-01-21 | End: 2023-01-26

## 2023-01-21 ASSESSMENT — ENCOUNTER SYMPTOMS
PSYCHIATRIC NEGATIVE: 1
RHINORRHEA: 1
EYE REDNESS: 1
EYE PAIN: 0
NEUROLOGICAL NEGATIVE: 1
HEMATOLOGIC/LYMPHATIC NEGATIVE: 1
EYE DISCHARGE: 1
EYE ITCHING: 1
ENDOCRINE NEGATIVE: 1
CONSTITUTIONAL NEGATIVE: 1
RESPIRATORY NEGATIVE: 1
PHOTOPHOBIA: 0
GASTROINTESTINAL NEGATIVE: 1

## 2023-02-01 ENCOUNTER — OFFICE VISIT (OUTPATIENT)
Dept: FAMILY MEDICINE | Age: 68
End: 2023-02-01

## 2023-02-01 VITALS
SYSTOLIC BLOOD PRESSURE: 170 MMHG | HEART RATE: 81 BPM | RESPIRATION RATE: 16 BRPM | DIASTOLIC BLOOD PRESSURE: 84 MMHG | HEIGHT: 67 IN | BODY MASS INDEX: 36.8 KG/M2 | OXYGEN SATURATION: 96 % | TEMPERATURE: 98.1 F

## 2023-02-01 DIAGNOSIS — H10.13 ALLERGIC CONJUNCTIVITIS OF BOTH EYES: ICD-10-CM

## 2023-02-01 DIAGNOSIS — H10.9 BACTERIAL CONJUNCTIVITIS OF BOTH EYES: ICD-10-CM

## 2023-02-01 DIAGNOSIS — J06.9 ACUTE UPPER RESPIRATORY INFECTION, UNSPECIFIED: Primary | ICD-10-CM

## 2023-02-01 DIAGNOSIS — B96.89 BACTERIAL CONJUNCTIVITIS OF BOTH EYES: ICD-10-CM

## 2023-02-01 DIAGNOSIS — J30.9 ALLERGIC RHINITIS, UNSPECIFIED SEASONALITY, UNSPECIFIED TRIGGER: ICD-10-CM

## 2023-02-01 DIAGNOSIS — I10 ESSENTIAL HYPERTENSION: ICD-10-CM

## 2023-02-01 PROCEDURE — 99214 OFFICE O/P EST MOD 30 MIN: CPT | Performed by: FAMILY MEDICINE

## 2023-02-01 RX ORDER — KETOTIFEN FUMARATE 0.25 MG/ML
1 SOLUTION/ DROPS OPHTHALMIC 2 TIMES DAILY
Qty: 5 ML | Refills: 1 | Status: SHIPPED | OUTPATIENT
Start: 2023-02-01

## 2023-02-01 RX ORDER — FLUTICASONE PROPIONATE 50 MCG
1 SPRAY, SUSPENSION (ML) NASAL DAILY
Qty: 1 EACH | Refills: 3 | Status: SHIPPED | OUTPATIENT
Start: 2023-02-01

## 2023-02-01 RX ORDER — AMLODIPINE BESYLATE 5 MG/1
5 TABLET ORAL DAILY
Qty: 90 TABLET | Refills: 0 | Status: SHIPPED | OUTPATIENT
Start: 2023-02-01 | End: 2023-04-12

## 2023-02-01 RX ORDER — FEXOFENADINE HCL 180 MG/1
180 TABLET ORAL DAILY
Qty: 30 TABLET | Refills: 3 | Status: SHIPPED | OUTPATIENT
Start: 2023-02-01

## 2023-02-01 ASSESSMENT — PAIN SCALES - GENERAL: PAINLEVEL: 0

## 2023-02-01 ASSESSMENT — PATIENT HEALTH QUESTIONNAIRE - PHQ9
SUM OF ALL RESPONSES TO PHQ9 QUESTIONS 1 AND 2: 0
2. FEELING DOWN, DEPRESSED OR HOPELESS: NOT AT ALL
SUM OF ALL RESPONSES TO PHQ9 QUESTIONS 1 AND 2: 0
CLINICAL INTERPRETATION OF PHQ2 SCORE: NO FURTHER SCREENING NEEDED
1. LITTLE INTEREST OR PLEASURE IN DOING THINGS: NOT AT ALL

## 2023-03-05 ENCOUNTER — TELEPHONE (OUTPATIENT)
Dept: SCHEDULING | Age: 68
End: 2023-03-05

## 2023-03-05 ENCOUNTER — NURSE TRIAGE (OUTPATIENT)
Dept: TELEHEALTH | Age: 68
End: 2023-03-05

## 2023-04-10 DIAGNOSIS — I10 ESSENTIAL HYPERTENSION: ICD-10-CM

## 2023-04-12 ENCOUNTER — TELEPHONE (OUTPATIENT)
Dept: FAMILY MEDICINE | Age: 68
End: 2023-04-12

## 2023-04-12 RX ORDER — AMLODIPINE BESYLATE 5 MG/1
TABLET ORAL
Qty: 90 TABLET | Refills: 0 | Status: SHIPPED | OUTPATIENT
Start: 2023-04-12 | End: 2023-07-20 | Stop reason: SDUPTHER

## 2023-04-14 DIAGNOSIS — I10 ESSENTIAL HYPERTENSION: ICD-10-CM

## 2023-04-14 RX ORDER — LOSARTAN POTASSIUM 100 MG/1
100 TABLET ORAL DAILY
Qty: 90 TABLET | Refills: 1 | OUTPATIENT
Start: 2023-04-14 | End: 2023-07-25

## 2023-05-17 ENCOUNTER — OFFICE VISIT (OUTPATIENT)
Dept: FAMILY MEDICINE | Age: 68
End: 2023-05-17

## 2023-05-17 VITALS
TEMPERATURE: 98.1 F | HEART RATE: 72 BPM | RESPIRATION RATE: 16 BRPM | DIASTOLIC BLOOD PRESSURE: 67 MMHG | SYSTOLIC BLOOD PRESSURE: 129 MMHG | OXYGEN SATURATION: 96 %

## 2023-05-17 DIAGNOSIS — I10 ESSENTIAL HYPERTENSION: Primary | ICD-10-CM

## 2023-05-17 DIAGNOSIS — Z12.31 VISIT FOR SCREENING MAMMOGRAM: ICD-10-CM

## 2023-05-17 PROCEDURE — 99213 OFFICE O/P EST LOW 20 MIN: CPT | Performed by: FAMILY MEDICINE

## 2023-06-30 ENCOUNTER — TELEPHONE (OUTPATIENT)
Dept: FAMILY MEDICINE | Age: 68
End: 2023-06-30

## 2023-07-19 DIAGNOSIS — I10 ESSENTIAL HYPERTENSION: ICD-10-CM

## 2023-07-20 RX ORDER — AMLODIPINE BESYLATE 5 MG/1
TABLET ORAL
Qty: 90 TABLET | Refills: 3 | Status: SHIPPED | OUTPATIENT
Start: 2023-07-20

## 2023-07-21 DIAGNOSIS — Z12.11 SCREEN FOR COLON CANCER: Primary | ICD-10-CM

## 2023-07-25 DIAGNOSIS — I10 ESSENTIAL HYPERTENSION: ICD-10-CM

## 2023-07-25 RX ORDER — LOSARTAN POTASSIUM 100 MG/1
100 TABLET ORAL DAILY
Qty: 90 TABLET | Refills: 1 | Status: SHIPPED | OUTPATIENT
Start: 2023-07-25

## 2023-07-31 ENCOUNTER — TELEPHONE (OUTPATIENT)
Dept: FAMILY MEDICINE | Age: 68
End: 2023-07-31

## 2023-08-17 ENCOUNTER — TELEPHONE (OUTPATIENT)
Dept: SCHEDULING | Age: 68
End: 2023-08-17

## 2023-10-30 ENCOUNTER — TELEPHONE (OUTPATIENT)
Dept: FAMILY MEDICINE | Age: 68
End: 2023-10-30

## 2023-10-30 DIAGNOSIS — Z12.11 SCREEN FOR COLON CANCER: ICD-10-CM

## 2023-11-13 ENCOUNTER — APPOINTMENT (OUTPATIENT)
Dept: LAB | Age: 68
End: 2023-11-13

## 2023-11-13 PROCEDURE — 82274 ASSAY TEST FOR BLOOD FECAL: CPT | Performed by: CLINICAL MEDICAL LABORATORY

## 2023-11-15 ENCOUNTER — TELEPHONE (OUTPATIENT)
Dept: FAMILY MEDICINE | Age: 68
End: 2023-11-15

## 2023-11-17 LAB — HEMOCCULT STL QL: NEGATIVE

## 2023-11-21 ENCOUNTER — TELEPHONE (OUTPATIENT)
Dept: FAMILY MEDICINE | Age: 68
End: 2023-11-21

## 2024-01-21 NOTE — PROGRESS NOTES
SPINE EVALUATION:   Referring Physician: Dr. Maurice Mcgarry  Diagnosis: Scoliosis, back pain     Date of Service: 6/6/2018     PATIENT Joan Santos is a 65year old y/o female who presents to therapy today with complaints of R sided thoracic pain Therapy to address the above impairments to maximize function with minimal pain. Thank you for referring Rosana Burns to [facility].     Precautions:  None  OBJECTIVE:   Observation/Posture: R sided prominence of rib cage and thoracic spine deviation OF CARE:    Goals:    · Pt will demonstrate decreased sensitivity of thoracic spine and R rib cage by 70% to improve sitting tolerance without pillow for short spans (8 visits)  · Pt will demonstrate good eccentric quad control with 4\" step down to decrea Opt out

## 2024-02-14 DIAGNOSIS — I10 ESSENTIAL HYPERTENSION: ICD-10-CM

## 2024-02-15 RX ORDER — LOSARTAN POTASSIUM 100 MG/1
100 TABLET ORAL DAILY
Qty: 90 TABLET | Refills: 0 | Status: SHIPPED | OUTPATIENT
Start: 2024-02-15

## 2024-04-02 DIAGNOSIS — J30.9 ALLERGIC RHINITIS, UNSPECIFIED SEASONALITY, UNSPECIFIED TRIGGER: ICD-10-CM

## 2024-04-02 RX ORDER — FLUTICASONE PROPIONATE 50 MCG
1 SPRAY, SUSPENSION (ML) NASAL DAILY
Qty: 16 G | Refills: 0 | Status: SHIPPED | OUTPATIENT
Start: 2024-04-02

## 2024-04-25 ENCOUNTER — TELEPHONE (OUTPATIENT)
Dept: FAMILY MEDICINE | Age: 69
End: 2024-04-25

## 2024-04-25 DIAGNOSIS — I10 ESSENTIAL HYPERTENSION: ICD-10-CM

## 2024-04-25 DIAGNOSIS — J30.9 ALLERGIC RHINITIS, UNSPECIFIED SEASONALITY, UNSPECIFIED TRIGGER: ICD-10-CM

## 2024-04-25 RX ORDER — FLUTICASONE PROPIONATE 50 MCG
1 SPRAY, SUSPENSION (ML) NASAL DAILY
Qty: 16 G | Refills: 0 | Status: SHIPPED | OUTPATIENT
Start: 2024-04-25

## 2024-04-25 RX ORDER — LOSARTAN POTASSIUM 100 MG/1
100 TABLET ORAL DAILY
Qty: 90 TABLET | Refills: 0 | Status: SHIPPED | OUTPATIENT
Start: 2024-04-25

## 2024-04-25 RX ORDER — AMLODIPINE BESYLATE 5 MG/1
5 TABLET ORAL DAILY
Qty: 90 TABLET | Refills: 0 | Status: SHIPPED | OUTPATIENT
Start: 2024-04-25

## 2024-04-25 RX ORDER — FEXOFENADINE HCL 180 MG/1
180 TABLET ORAL DAILY
Qty: 90 TABLET | Refills: 0 | Status: SHIPPED | OUTPATIENT
Start: 2024-04-25

## 2024-05-15 ENCOUNTER — TELEPHONE (OUTPATIENT)
Dept: FAMILY MEDICINE | Age: 69
End: 2024-05-15

## 2024-05-17 DIAGNOSIS — I10 ESSENTIAL HYPERTENSION: ICD-10-CM

## 2024-05-17 RX ORDER — LOSARTAN POTASSIUM 100 MG/1
100 TABLET ORAL DAILY
Qty: 90 TABLET | Refills: 0 | Status: CANCELLED | OUTPATIENT
Start: 2024-05-17

## 2024-06-20 ENCOUNTER — TELEPHONE (OUTPATIENT)
Dept: FAMILY MEDICINE | Age: 69
End: 2024-06-20

## 2024-07-09 ENCOUNTER — TELEPHONE (OUTPATIENT)
Dept: FAMILY MEDICINE | Age: 69
End: 2024-07-09

## 2024-07-10 ENCOUNTER — APPOINTMENT (OUTPATIENT)
Dept: FAMILY MEDICINE | Age: 69
End: 2024-07-10

## 2024-07-10 VITALS
TEMPERATURE: 98.3 F | RESPIRATION RATE: 16 BRPM | HEIGHT: 67 IN | SYSTOLIC BLOOD PRESSURE: 139 MMHG | WEIGHT: 230 LBS | BODY MASS INDEX: 36.1 KG/M2 | HEART RATE: 72 BPM | OXYGEN SATURATION: 97 % | DIASTOLIC BLOOD PRESSURE: 82 MMHG

## 2024-07-10 DIAGNOSIS — I10 ESSENTIAL HYPERTENSION: Primary | ICD-10-CM

## 2024-07-10 DIAGNOSIS — H61.22 IMPACTED CERUMEN OF LEFT EAR: ICD-10-CM

## 2024-07-10 DIAGNOSIS — Z12.31 VISIT FOR SCREENING MAMMOGRAM: ICD-10-CM

## 2024-07-10 DIAGNOSIS — E78.5 HYPERLIPIDEMIA, UNSPECIFIED HYPERLIPIDEMIA TYPE: ICD-10-CM

## 2024-07-10 DIAGNOSIS — R73.9 HYPERGLYCEMIA: ICD-10-CM

## 2024-07-10 DIAGNOSIS — Z11.59 NEED FOR HEPATITIS C SCREENING TEST: ICD-10-CM

## 2024-07-10 PROCEDURE — 99214 OFFICE O/P EST MOD 30 MIN: CPT | Performed by: FAMILY MEDICINE

## 2024-07-10 RX ORDER — LOSARTAN POTASSIUM 100 MG/1
100 TABLET ORAL DAILY
Qty: 90 TABLET | Refills: 1 | Status: SHIPPED | OUTPATIENT
Start: 2024-07-10

## 2024-07-10 RX ORDER — AMLODIPINE BESYLATE 5 MG/1
5 TABLET ORAL DAILY
Qty: 90 TABLET | Refills: 1 | Status: SHIPPED | OUTPATIENT
Start: 2024-07-10

## 2024-09-26 DIAGNOSIS — J30.9 ALLERGIC RHINITIS, UNSPECIFIED SEASONALITY, UNSPECIFIED TRIGGER: ICD-10-CM

## 2024-09-26 RX ORDER — FLUTICASONE PROPIONATE 50 MCG
SPRAY, SUSPENSION (ML) NASAL
Qty: 16 G | Refills: 0 | Status: SHIPPED | OUTPATIENT
Start: 2024-09-26

## 2024-09-27 ENCOUNTER — TELEPHONE (OUTPATIENT)
Dept: FAMILY MEDICINE | Age: 69
End: 2024-09-27

## 2025-02-19 DIAGNOSIS — I10 ESSENTIAL HYPERTENSION: ICD-10-CM

## 2025-02-19 RX ORDER — LOSARTAN POTASSIUM 100 MG/1
TABLET ORAL
Qty: 30 TABLET | Refills: 0 | Status: SHIPPED | OUTPATIENT
Start: 2025-02-19 | End: 2025-03-20

## 2025-03-06 DIAGNOSIS — I10 ESSENTIAL HYPERTENSION: ICD-10-CM

## 2025-03-06 RX ORDER — AMLODIPINE BESYLATE 5 MG/1
5 TABLET ORAL DAILY
Qty: 90 TABLET | Refills: 1 | Status: SHIPPED | OUTPATIENT
Start: 2025-03-06

## 2025-03-20 DIAGNOSIS — J30.9 ALLERGIC RHINITIS, UNSPECIFIED SEASONALITY, UNSPECIFIED TRIGGER: ICD-10-CM

## 2025-03-20 DIAGNOSIS — I10 ESSENTIAL HYPERTENSION: ICD-10-CM

## 2025-03-20 RX ORDER — FLUTICASONE PROPIONATE 50 MCG
SPRAY, SUSPENSION (ML) NASAL
Qty: 16 G | Refills: 0 | Status: SHIPPED | OUTPATIENT
Start: 2025-03-20

## 2025-03-20 RX ORDER — LOSARTAN POTASSIUM 100 MG/1
TABLET ORAL
Qty: 30 TABLET | Refills: 0 | Status: SHIPPED | OUTPATIENT
Start: 2025-03-20

## 2025-04-01 ENCOUNTER — TELEPHONE (OUTPATIENT)
Dept: FAMILY MEDICINE | Age: 70
End: 2025-04-01

## 2025-04-02 ENCOUNTER — APPOINTMENT (OUTPATIENT)
Dept: FAMILY MEDICINE | Age: 70
End: 2025-04-02

## 2025-04-02 VITALS
WEIGHT: 231 LBS | BODY MASS INDEX: 36.26 KG/M2 | SYSTOLIC BLOOD PRESSURE: 126 MMHG | TEMPERATURE: 99.2 F | OXYGEN SATURATION: 97 % | HEIGHT: 67 IN | DIASTOLIC BLOOD PRESSURE: 62 MMHG | HEART RATE: 78 BPM | RESPIRATION RATE: 16 BRPM

## 2025-04-02 DIAGNOSIS — E66.09 CLASS 2 OBESITY DUE TO EXCESS CALORIES WITHOUT SERIOUS COMORBIDITY WITH BODY MASS INDEX (BMI) OF 39.0 TO 39.9 IN ADULT: ICD-10-CM

## 2025-04-02 DIAGNOSIS — Z12.11 SCREEN FOR COLON CANCER: ICD-10-CM

## 2025-04-02 DIAGNOSIS — E66.812 CLASS 2 OBESITY DUE TO EXCESS CALORIES WITHOUT SERIOUS COMORBIDITY WITH BODY MASS INDEX (BMI) OF 39.0 TO 39.9 IN ADULT: ICD-10-CM

## 2025-04-02 DIAGNOSIS — R73.9 HYPERGLYCEMIA: ICD-10-CM

## 2025-04-02 DIAGNOSIS — E78.5 HYPERLIPIDEMIA, UNSPECIFIED HYPERLIPIDEMIA TYPE: ICD-10-CM

## 2025-04-02 DIAGNOSIS — H61.22 EXCESSIVE CERUMEN IN LEFT EAR CANAL: ICD-10-CM

## 2025-04-02 DIAGNOSIS — E66.01 CLASS 2 SEVERE OBESITY WITH BODY MASS INDEX (BMI) OF 35 TO 39.9 WITH SERIOUS COMORBIDITY (CMD): ICD-10-CM

## 2025-04-02 DIAGNOSIS — E66.812 CLASS 2 SEVERE OBESITY WITH BODY MASS INDEX (BMI) OF 35 TO 39.9 WITH SERIOUS COMORBIDITY (CMD): ICD-10-CM

## 2025-04-02 DIAGNOSIS — Z12.31 VISIT FOR SCREENING MAMMOGRAM: ICD-10-CM

## 2025-04-02 DIAGNOSIS — I10 ESSENTIAL HYPERTENSION: Primary | ICD-10-CM

## 2025-04-02 RX ORDER — AMLODIPINE BESYLATE 5 MG/1
5 TABLET ORAL DAILY
Qty: 90 TABLET | Refills: 3 | Status: SHIPPED | OUTPATIENT
Start: 2025-04-02

## 2025-04-02 RX ORDER — LOSARTAN POTASSIUM 100 MG/1
100 TABLET ORAL DAILY
Qty: 90 TABLET | Refills: 3 | Status: SHIPPED | OUTPATIENT
Start: 2025-04-02

## 2025-04-02 ASSESSMENT — ENCOUNTER SYMPTOMS
HEMATOLOGIC/LYMPHATIC NEGATIVE: 1
GASTROINTESTINAL NEGATIVE: 1
NEUROLOGICAL NEGATIVE: 1
RESPIRATORY NEGATIVE: 1
ALLERGIC/IMMUNOLOGIC NEGATIVE: 1
CHEST TIGHTNESS: 0
CONSTITUTIONAL NEGATIVE: 1
EYES NEGATIVE: 1
PSYCHIATRIC NEGATIVE: 1
ENDOCRINE NEGATIVE: 1
SHORTNESS OF BREATH: 0

## 2025-04-02 ASSESSMENT — PATIENT HEALTH QUESTIONNAIRE - PHQ9
1. LITTLE INTEREST OR PLEASURE IN DOING THINGS: SEVERAL DAYS
2. FEELING DOWN, DEPRESSED OR HOPELESS: SEVERAL DAYS
CLINICAL INTERPRETATION OF PHQ2 SCORE: NO FURTHER SCREENING NEEDED
SUM OF ALL RESPONSES TO PHQ9 QUESTIONS 1 AND 2: 2
SUM OF ALL RESPONSES TO PHQ9 QUESTIONS 1 AND 2: 2

## 2025-04-03 LAB
ALBUMIN SERPL-MCNC: 3.8 G/DL (ref 3.4–5)
ALBUMIN/GLOB SERPL: 1.2 {RATIO} (ref 1–2.4)
ALP SERPL-CCNC: 90 UNITS/L (ref 45–117)
ALT SERPL-CCNC: 19 UNITS/L
ANION GAP SERPL CALC-SCNC: 12 MMOL/L (ref 7–19)
AST SERPL-CCNC: 16 UNITS/L
BILIRUB SERPL-MCNC: 0.5 MG/DL (ref 0.2–1)
BUN SERPL-MCNC: 16 MG/DL (ref 6–20)
BUN/CREAT SERPL: 18 (ref 7–25)
CALCIUM SERPL-MCNC: 10.6 MG/DL (ref 8.4–10.2)
CHLORIDE SERPL-SCNC: 108 MMOL/L (ref 97–110)
CHOLEST SERPL-MCNC: 237 MG/DL
CHOLEST/HDLC SERPL: 4.4 {RATIO}
CO2 SERPL-SCNC: 23 MMOL/L (ref 21–32)
CREAT SERPL-MCNC: 0.88 MG/DL (ref 0.51–0.95)
EGFRCR SERPLBLD CKD-EPI 2021: 72 ML/MIN/{1.73_M2}
FASTING DURATION TIME PATIENT: ABNORMAL H
GLOBULIN SER-MCNC: 3.1 G/DL (ref 2–4)
GLUCOSE SERPL-MCNC: 87 MG/DL (ref 70–99)
HBA1C MFR BLD: 5.3 % (ref 4.5–5.6)
HDLC SERPL-MCNC: 54 MG/DL
LDLC SERPL CALC-MCNC: 163 MG/DL
NONHDLC SERPL-MCNC: 183 MG/DL
POTASSIUM SERPL-SCNC: 4.1 MMOL/L (ref 3.4–5.1)
PROT SERPL-MCNC: 6.9 G/DL (ref 6.4–8.2)
SODIUM SERPL-SCNC: 139 MMOL/L (ref 135–145)
TRIGL SERPL-MCNC: 98 MG/DL
TSH SERPL-ACNC: 1.37 MCUNITS/ML (ref 0.35–5)

## 2025-04-04 ENCOUNTER — HOSPITAL ENCOUNTER (OUTPATIENT)
Dept: MAMMOGRAPHY | Age: 70
Discharge: HOME | End: 2025-04-04
Attending: FAMILY MEDICINE

## 2025-04-04 DIAGNOSIS — Z12.31 VISIT FOR SCREENING MAMMOGRAM: ICD-10-CM

## 2025-04-04 PROCEDURE — 77067 SCR MAMMO BI INCL CAD: CPT

## 2025-04-09 ENCOUNTER — TELEPHONE (OUTPATIENT)
Dept: FAMILY MEDICINE | Age: 70
End: 2025-04-09

## 2025-04-22 DIAGNOSIS — I10 ESSENTIAL HYPERTENSION: ICD-10-CM

## 2025-04-22 RX ORDER — LOSARTAN POTASSIUM 100 MG/1
100 TABLET ORAL DAILY
Qty: 90 TABLET | Refills: 3 | Status: CANCELLED | OUTPATIENT
Start: 2025-04-22

## 2025-05-27 DIAGNOSIS — J30.9 ALLERGIC RHINITIS, UNSPECIFIED SEASONALITY, UNSPECIFIED TRIGGER: ICD-10-CM

## 2025-05-27 RX ORDER — FLUTICASONE PROPIONATE 50 MCG
SPRAY, SUSPENSION (ML) NASAL
Qty: 16 G | Refills: 0 | Status: SHIPPED | OUTPATIENT
Start: 2025-05-27

## 2025-06-02 ENCOUNTER — TELEPHONE (OUTPATIENT)
Dept: FAMILY MEDICINE | Age: 70
End: 2025-06-02

## 2025-06-25 ENCOUNTER — TELEPHONE (OUTPATIENT)
Dept: FAMILY MEDICINE | Age: 70
End: 2025-06-25

## 2025-07-16 ENCOUNTER — TELEPHONE (OUTPATIENT)
Dept: FAMILY MEDICINE | Age: 70
End: 2025-07-16

## 2025-07-22 LAB — HEMOCCULT STL QL: POSITIVE

## 2025-07-23 ENCOUNTER — TELEPHONE (OUTPATIENT)
Dept: FAMILY MEDICINE | Age: 70
End: 2025-07-23

## 2025-07-28 ENCOUNTER — APPOINTMENT (OUTPATIENT)
Dept: CT IMAGING | Age: 70
DRG: 392 | End: 2025-07-28
Attending: STUDENT IN AN ORGANIZED HEALTH CARE EDUCATION/TRAINING PROGRAM

## 2025-07-28 ENCOUNTER — HOSPITAL ENCOUNTER (INPATIENT)
Age: 70
LOS: 3 days | Discharge: HOME | DRG: 392 | End: 2025-07-31
Attending: STUDENT IN AN ORGANIZED HEALTH CARE EDUCATION/TRAINING PROGRAM | Admitting: INTERNAL MEDICINE

## 2025-07-28 DIAGNOSIS — K57.20 DIVERTICULITIS OF LARGE INTESTINE WITH PERFORATION WITHOUT BLEEDING: Primary | ICD-10-CM

## 2025-07-28 LAB
ALBUMIN SERPL-MCNC: 3.2 G/DL (ref 3.4–5)
ALBUMIN/GLOB SERPL: 0.8 {RATIO} (ref 1–2.4)
ALP SERPL-CCNC: 106 UNITS/L (ref 45–117)
ALT SERPL-CCNC: 11 UNITS/L
ANION GAP SERPL CALC-SCNC: 12 MMOL/L (ref 7–19)
APPEARANCE UR: CLEAR
AST SERPL-CCNC: 12 UNITS/L
BASOPHILS # BLD: 0 K/MCL (ref 0–0.3)
BASOPHILS NFR BLD: 1 %
BILIRUB SERPL-MCNC: 0.6 MG/DL (ref 0.2–1)
BILIRUB UR QL STRIP: NEGATIVE
BUN SERPL-MCNC: 12 MG/DL (ref 6–20)
BUN/CREAT SERPL: 12 (ref 7–25)
CALCIUM SERPL-MCNC: 10.6 MG/DL (ref 8.4–10.2)
CHLORIDE SERPL-SCNC: 106 MMOL/L (ref 97–110)
CO2 SERPL-SCNC: 22 MMOL/L (ref 21–32)
COLOR UR: ABNORMAL
CREAT SERPL-MCNC: 0.98 MG/DL (ref 0.51–0.95)
DEPRECATED RDW RBC: 40.1 FL (ref 39–50)
EGFRCR SERPLBLD CKD-EPI 2021: 62 ML/MIN/{1.73_M2}
EOSINOPHIL # BLD: 0.1 K/MCL (ref 0–0.5)
EOSINOPHIL NFR BLD: 1 %
ERYTHROCYTE [DISTWIDTH] IN BLOOD: 11.9 % (ref 11–15)
ETHANOL SERPL-MCNC: NORMAL MG/DL
FASTING DURATION TIME PATIENT: ABNORMAL H
GLOBULIN SER-MCNC: 4.2 G/DL (ref 2–4)
GLUCOSE SERPL-MCNC: 111 MG/DL (ref 70–99)
GLUCOSE UR STRIP-MCNC: NEGATIVE MG/DL
HCT VFR BLD CALC: 39.9 % (ref 36–46.5)
HGB BLD-MCNC: 13.3 G/DL (ref 12–15.5)
HGB UR QL STRIP: NEGATIVE
IMM GRANULOCYTES # BLD AUTO: 0 K/MCL (ref 0–0.2)
IMM GRANULOCYTES # BLD: 0 %
KETONES UR STRIP-MCNC: ABNORMAL MG/DL
LEUKOCYTE ESTERASE UR QL STRIP: NEGATIVE
LIPASE SERPL-CCNC: 25 UNITS/L (ref 15–77)
LYMPHOCYTES # BLD: 1.4 K/MCL (ref 1–4)
LYMPHOCYTES NFR BLD: 17 %
MCH RBC QN AUTO: 30.5 PG (ref 26–34)
MCHC RBC AUTO-ENTMCNC: 33.3 G/DL (ref 32–36.5)
MCV RBC AUTO: 91.5 FL (ref 78–100)
MONOCYTES # BLD: 0.8 K/MCL (ref 0.3–0.9)
MONOCYTES NFR BLD: 9 %
NEUTROPHILS # BLD: 6.2 K/MCL (ref 1.8–7.7)
NEUTROPHILS NFR BLD: 72 %
NITRITE UR QL STRIP: NEGATIVE
NRBC BLD MANUAL-RTO: 0 /100 WBC
PH UR STRIP: 5.5 [PH] (ref 5–7)
PLATELET # BLD AUTO: 230 K/MCL (ref 140–450)
POTASSIUM SERPL-SCNC: 3.7 MMOL/L (ref 3.4–5.1)
PROT SERPL-MCNC: 7.4 G/DL (ref 6.4–8.2)
PROT UR STRIP-MCNC: NEGATIVE MG/DL
RAINBOW EXTRA TUBES HOLD SPECIMEN: NORMAL
RAINBOW EXTRA TUBES HOLD SPECIMEN: NORMAL
RBC # BLD: 4.36 MIL/MCL (ref 4–5.2)
SODIUM SERPL-SCNC: 136 MMOL/L (ref 135–145)
SP GR UR STRIP: 1.01 (ref 1–1.03)
UROBILINOGEN UR STRIP-MCNC: 0.2 MG/DL
WBC # BLD: 8.6 K/MCL (ref 4.2–11)

## 2025-07-28 PROCEDURE — 82077 ASSAY SPEC XCP UR&BREATH IA: CPT

## 2025-07-28 PROCEDURE — 87077 CULTURE AEROBIC IDENTIFY: CPT | Performed by: STUDENT IN AN ORGANIZED HEALTH CARE EDUCATION/TRAINING PROGRAM

## 2025-07-28 PROCEDURE — 10002800 HB RX 250 W HCPCS: Performed by: STUDENT IN AN ORGANIZED HEALTH CARE EDUCATION/TRAINING PROGRAM

## 2025-07-28 PROCEDURE — 81003 URINALYSIS AUTO W/O SCOPE: CPT | Performed by: STUDENT IN AN ORGANIZED HEALTH CARE EDUCATION/TRAINING PROGRAM

## 2025-07-28 PROCEDURE — 87040 BLOOD CULTURE FOR BACTERIA: CPT | Performed by: STUDENT IN AN ORGANIZED HEALTH CARE EDUCATION/TRAINING PROGRAM

## 2025-07-28 PROCEDURE — 83690 ASSAY OF LIPASE: CPT | Performed by: EMERGENCY MEDICINE

## 2025-07-28 PROCEDURE — 80053 COMPREHEN METABOLIC PANEL: CPT | Performed by: EMERGENCY MEDICINE

## 2025-07-28 PROCEDURE — 10002807 HB RX 258

## 2025-07-28 PROCEDURE — 99284 EMERGENCY DEPT VISIT MOD MDM: CPT | Performed by: STUDENT IN AN ORGANIZED HEALTH CARE EDUCATION/TRAINING PROGRAM

## 2025-07-28 PROCEDURE — 85025 COMPLETE CBC W/AUTO DIFF WBC: CPT | Performed by: EMERGENCY MEDICINE

## 2025-07-28 PROCEDURE — 96372 THER/PROPH/DIAG INJ SC/IM: CPT | Performed by: INTERNAL MEDICINE

## 2025-07-28 PROCEDURE — 10002805 HB CONTRAST AGENT: Performed by: STUDENT IN AN ORGANIZED HEALTH CARE EDUCATION/TRAINING PROGRAM

## 2025-07-28 PROCEDURE — 10002800 HB RX 250 W HCPCS: Performed by: INTERNAL MEDICINE

## 2025-07-28 PROCEDURE — 10002807 HB RX 258: Performed by: INTERNAL MEDICINE

## 2025-07-28 PROCEDURE — 10002800 HB RX 250 W HCPCS

## 2025-07-28 PROCEDURE — 99223 1ST HOSP IP/OBS HIGH 75: CPT | Performed by: STUDENT IN AN ORGANIZED HEALTH CARE EDUCATION/TRAINING PROGRAM

## 2025-07-28 PROCEDURE — 99223 1ST HOSP IP/OBS HIGH 75: CPT | Performed by: INTERNAL MEDICINE

## 2025-07-28 PROCEDURE — 10000002 HB ROOM CHARGE MED SURG

## 2025-07-28 PROCEDURE — 74177 CT ABD & PELVIS W/CONTRAST: CPT

## 2025-07-28 RX ORDER — ACETAMINOPHEN 650 MG/1
650 SUPPOSITORY RECTAL EVERY 4 HOURS PRN
Status: DISCONTINUED | OUTPATIENT
Start: 2025-07-28 | End: 2025-07-31 | Stop reason: HOSPADM

## 2025-07-28 RX ORDER — HEPARIN SODIUM 5000 [USP'U]/ML
5000 INJECTION, SOLUTION INTRAVENOUS; SUBCUTANEOUS EVERY 8 HOURS SCHEDULED
Status: DISCONTINUED | OUTPATIENT
Start: 2025-07-28 | End: 2025-07-31 | Stop reason: HOSPADM

## 2025-07-28 RX ORDER — 0.9 % SODIUM CHLORIDE 0.9 %
2 VIAL (ML) INJECTION EVERY 12 HOURS SCHEDULED
Status: DISCONTINUED | OUTPATIENT
Start: 2025-07-28 | End: 2025-07-31 | Stop reason: HOSPADM

## 2025-07-28 RX ORDER — SODIUM CHLORIDE, SODIUM LACTATE, POTASSIUM CHLORIDE, CALCIUM CHLORIDE 600; 310; 30; 20 MG/100ML; MG/100ML; MG/100ML; MG/100ML
INJECTION, SOLUTION INTRAVENOUS CONTINUOUS
Status: DISCONTINUED | OUTPATIENT
Start: 2025-07-28 | End: 2025-07-31 | Stop reason: HOSPADM

## 2025-07-28 RX ORDER — 0.9 % SODIUM CHLORIDE 0.9 %
10 VIAL (ML) INJECTION PRN
Status: DISCONTINUED | OUTPATIENT
Start: 2025-07-28 | End: 2025-07-31 | Stop reason: HOSPADM

## 2025-07-28 RX ORDER — ACETAMINOPHEN 325 MG/1
650 TABLET ORAL EVERY 4 HOURS PRN
Status: DISCONTINUED | OUTPATIENT
Start: 2025-07-28 | End: 2025-07-31 | Stop reason: HOSPADM

## 2025-07-28 RX ORDER — MOXIFLOXACIN HYDROCHLORIDE 400 MG/250ML
400 INJECTION, SOLUTION INTRAVENOUS ONCE
Status: COMPLETED | OUTPATIENT
Start: 2025-07-28 | End: 2025-07-28

## 2025-07-28 RX ADMIN — IOHEXOL 75 ML: 350 INJECTION, SOLUTION INTRAVENOUS at 11:14

## 2025-07-28 RX ADMIN — AMPICILLIN SODIUM AND SULBACTAM SODIUM 3 G: 2; 1 INJECTION, POWDER, FOR SOLUTION INTRAMUSCULAR; INTRAVENOUS at 23:14

## 2025-07-28 RX ADMIN — AMPICILLIN SODIUM AND SULBACTAM SODIUM 3 G: 2; 1 INJECTION, POWDER, FOR SOLUTION INTRAMUSCULAR; INTRAVENOUS at 17:34

## 2025-07-28 RX ADMIN — SODIUM CHLORIDE, POTASSIUM CHLORIDE, SODIUM LACTATE AND CALCIUM CHLORIDE: 600; 310; 30; 20 INJECTION, SOLUTION INTRAVENOUS at 17:33

## 2025-07-28 RX ADMIN — HEPARIN SODIUM 5000 UNITS: 5000 INJECTION INTRAVENOUS; SUBCUTANEOUS at 21:35

## 2025-07-28 RX ADMIN — MOXIFLOXACIN HYDROCHLORIDE 400 MG: 400 INJECTION, SOLUTION INTRAVENOUS at 13:27

## 2025-07-28 SDOH — SOCIAL STABILITY: SOCIAL INSECURITY: HOW OFTEN DOES ANYONE, INCLUDING FAMILY AND FRIENDS, SCREAM OR CURSE AT YOU?: NEVER

## 2025-07-28 SDOH — ECONOMIC STABILITY: TRANSPORTATION INSECURITY
IN THE PAST 12 MONTHS, HAS LACK OF RELIABLE TRANSPORTATION KEPT YOU FROM MEDICAL APPOINTMENTS, MEETINGS, WORK OR FROM GETTING THINGS NEEDED FOR DAILY LIVING?: NO

## 2025-07-28 SDOH — ECONOMIC STABILITY: HOUSING INSECURITY: WHAT IS YOUR LIVING SITUATION TODAY?: SENIOR APARTMENT

## 2025-07-28 SDOH — SOCIAL STABILITY: SOCIAL NETWORK
HOW OFTEN DO YOU SEE OR TALK TO PEOPLE THAT YOU CARE ABOUT AND FEEL CLOSE TO? (FOR EXAMPLE: TALKING TO FRIENDS ON THE PHONE, VISITING FRIENDS OR FAMILY, GOING TO CHURCH OR CLUB MEETINGS): 3 TO 5 TIMES A WEEK

## 2025-07-28 SDOH — SOCIAL STABILITY: SOCIAL INSECURITY: HOW OFTEN DOES ANYONE, INCLUDING FAMILY AND FRIENDS, PHYSICALLY HURT YOU?: NEVER

## 2025-07-28 SDOH — ECONOMIC STABILITY: INCOME INSECURITY: IN THE PAST 12 MONTHS, HAS THE ELECTRIC, GAS, OIL, OR WATER COMPANY THREATENED TO SHUT OFF SERVICE IN YOUR HOME?: NO

## 2025-07-28 SDOH — SOCIAL STABILITY: SOCIAL INSECURITY: HOW OFTEN DOES ANYONE, INCLUDING FAMILY AND FRIENDS, THREATEN YOU WITH HARM?: NEVER

## 2025-07-28 SDOH — ECONOMIC STABILITY: FOOD INSECURITY: WITHIN THE PAST 12 MONTHS, THE FOOD YOU BOUGHT JUST DIDN'T LAST AND YOU DIDN'T HAVE MONEY TO GET MORE.: NEVER TRUE

## 2025-07-28 SDOH — ECONOMIC STABILITY: HOUSING INSECURITY: WHAT IS YOUR LIVING SITUATION TODAY?: I HAVE A STEADY PLACE TO LIVE

## 2025-07-28 SDOH — HEALTH STABILITY: GENERAL
BECAUSE OF A PHYSICAL, MENTAL, OR EMOTIONAL CONDITION, DO YOU HAVE SERIOUS DIFFICULTY CONCENTRATING, REMEMBERING OR MAKING DECISIONS?: NO

## 2025-07-28 SDOH — HEALTH STABILITY: PHYSICAL HEALTH: DO YOU HAVE DIFFICULTY DRESSING OR BATHING?: NO

## 2025-07-28 SDOH — SOCIAL STABILITY: SOCIAL NETWORK: SUPPORT SYSTEMS: FRIENDS

## 2025-07-28 SDOH — ECONOMIC STABILITY: HOUSING INSECURITY: DO YOU HAVE PROBLEMS WITH ANY OF THE FOLLOWING?: NONE OF THE ABOVE

## 2025-07-28 SDOH — ECONOMIC STABILITY: HOUSING INSECURITY: WHAT IS YOUR LIVING SITUATION TODAY?: ALONE

## 2025-07-28 SDOH — ECONOMIC STABILITY: GENERAL

## 2025-07-28 SDOH — SOCIAL STABILITY: SOCIAL INSECURITY: HOW OFTEN DOES ANYONE, INCLUDING FAMILY AND FRIENDS, INSULT OR TALK DOWN TO YOU?: NEVER

## 2025-07-28 SDOH — HEALTH STABILITY: GENERAL: BECAUSE OF A PHYSICAL, MENTAL, OR EMOTIONAL CONDITION, DO YOU HAVE DIFFICULTY DOING ERRANDS ALONE?: NO

## 2025-07-28 SDOH — HEALTH STABILITY: PHYSICAL HEALTH: DO YOU HAVE SERIOUS DIFFICULTY WALKING OR CLIMBING STAIRS?: NO

## 2025-07-28 ASSESSMENT — PATIENT HEALTH QUESTIONNAIRE - PHQ9
SUM OF ALL RESPONSES TO PHQ9 QUESTIONS 1 AND 2: 2
2. FEELING DOWN, DEPRESSED OR HOPELESS: SEVERAL DAYS
CLINICAL INTERPRETATION OF PHQ2 SCORE: NO FURTHER SCREENING NEEDED
IS PATIENT ABLE TO COMPLETE PHQ2 OR PHQ9: YES
SUM OF ALL RESPONSES TO PHQ9 QUESTIONS 1 AND 2: 2
1. LITTLE INTEREST OR PLEASURE IN DOING THINGS: SEVERAL DAYS

## 2025-07-28 ASSESSMENT — ENCOUNTER SYMPTOMS
ANAL BLEEDING: 0
SORE THROAT: 0
ABDOMINAL PAIN: 1
FATIGUE: 0
DIZZINESS: 0
ACTIVITY CHANGE: 0
ABDOMINAL PAIN: 0
CONFUSION: 0
UNEXPECTED WEIGHT CHANGE: 0
CONSTIPATION: 0
COLOR CHANGE: 0
TREMORS: 0
COUGH: 0
CHILLS: 0
DIARRHEA: 0
HEADACHES: 0
SHORTNESS OF BREATH: 0
HALLUCINATIONS: 0
WEAKNESS: 0
TROUBLE SWALLOWING: 0
FEVER: 0
BLOOD IN STOOL: 0
RECTAL PAIN: 1
DEPRESSION: 0
APPETITE CHANGE: 1
DIAPHORESIS: 0
NAUSEA: 0
ABDOMINAL DISTENTION: 1
HEARTBURN: 0
LIGHT-HEADEDNESS: 0
VOMITING: 0
VOICE CHANGE: 0

## 2025-07-28 ASSESSMENT — ORIENTATION MEMORY CONCENTRATION TEST (OMCT)
OMCT INTERPRETATION: 0-6: NO SIGNIFICANT IMPAIRMENT
WHAT MONTH IS IT NOW: CORRECT
WHAT TIME IS IT (NO WATCH OR CLOCK): CORRECT
COUNT BACKWARDS FROM 20 TO 1: CORRECT
REPEAT THE NAME AND ADDRESS I ASKED YOU TO REMEMBER: 2 ERRORS
SAY THE MONTHS IN REVERSE ORDER STARTING WITH LAST MONTH: CORRECT
WHAT YEAR IS IT NOW (MUST BE EXACT): CORRECT
OMCT SCORE: 4

## 2025-07-28 ASSESSMENT — LIFESTYLE VARIABLES
HOW OFTEN DO YOU HAVE 6 OR MORE DRINKS ON ONE OCCASION: NEVER
ALCOHOL_USE_STATUS: NO OR LOW RISK WITH VALIDATED TOOL
HOW OFTEN DO YOU HAVE A DRINK CONTAINING ALCOHOL: NEVER
HOW MANY STANDARD DRINKS CONTAINING ALCOHOL DO YOU HAVE ON A TYPICAL DAY: 0,1 OR 2
AUDIT-C TOTAL SCORE: 0

## 2025-07-28 ASSESSMENT — ACTIVITIES OF DAILY LIVING (ADL)
RECENT_DECLINE_ADL: NO
ADL_BEFORE_ADMISSION: INDEPENDENT
ADL_SCORE: 12
ADL_SHORT_OF_BREATH: NO

## 2025-07-28 ASSESSMENT — COLUMBIA-SUICIDE SEVERITY RATING SCALE - C-SSRS: IS THE PATIENT ABLE TO COMPLETE C-SSRS: YES

## 2025-07-28 ASSESSMENT — PAIN SCALES - GENERAL
PAINLEVEL_OUTOF10: 0
PAINLEVEL_OUTOF10: 0
PAINLEVEL_OUTOF10: 5

## 2025-07-29 LAB
ALBUMIN SERPL-MCNC: 2.6 G/DL (ref 3.4–5)
ALBUMIN/GLOB SERPL: 0.7 {RATIO} (ref 1–2.4)
ALP SERPL-CCNC: 86 UNITS/L (ref 45–117)
ALT SERPL-CCNC: 9 UNITS/L
ANION GAP SERPL CALC-SCNC: 10 MMOL/L (ref 7–19)
AST SERPL-CCNC: 10 UNITS/L
BASOPHILS # BLD: 0 K/MCL (ref 0–0.3)
BASOPHILS NFR BLD: 1 %
BILIRUB SERPL-MCNC: 0.6 MG/DL (ref 0.2–1)
BUN SERPL-MCNC: 8 MG/DL (ref 6–20)
BUN/CREAT SERPL: 9 (ref 7–25)
CALCIUM SERPL-MCNC: 9.8 MG/DL (ref 8.4–10.2)
CHLORIDE SERPL-SCNC: 109 MMOL/L (ref 97–110)
CO2 SERPL-SCNC: 24 MMOL/L (ref 21–32)
CREAT SERPL-MCNC: 0.86 MG/DL (ref 0.51–0.95)
DEPRECATED RDW RBC: 40.2 FL (ref 39–50)
EGFRCR SERPLBLD CKD-EPI 2021: 73 ML/MIN/{1.73_M2}
EOSINOPHIL # BLD: 0.1 K/MCL (ref 0–0.5)
EOSINOPHIL NFR BLD: 2 %
ERYTHROCYTE [DISTWIDTH] IN BLOOD: 11.9 % (ref 11–15)
FASTING DURATION TIME PATIENT: ABNORMAL H
GLOBULIN SER-MCNC: 3.6 G/DL (ref 2–4)
GLUCOSE SERPL-MCNC: 88 MG/DL (ref 70–99)
HCT VFR BLD CALC: 35.7 % (ref 36–46.5)
HGB BLD-MCNC: 11.8 G/DL (ref 12–15.5)
IMM GRANULOCYTES # BLD AUTO: 0 K/MCL (ref 0–0.2)
IMM GRANULOCYTES # BLD: 1 %
LYMPHOCYTES # BLD: 1 K/MCL (ref 1–4)
LYMPHOCYTES NFR BLD: 15 %
MCH RBC QN AUTO: 30.3 PG (ref 26–34)
MCHC RBC AUTO-ENTMCNC: 33.1 G/DL (ref 32–36.5)
MCV RBC AUTO: 91.8 FL (ref 78–100)
MONOCYTES # BLD: 0.6 K/MCL (ref 0.3–0.9)
MONOCYTES NFR BLD: 10 %
NEUTROPHILS # BLD: 4.6 K/MCL (ref 1.8–7.7)
NEUTROPHILS NFR BLD: 71 %
NRBC BLD MANUAL-RTO: 0 /100 WBC
PLATELET # BLD AUTO: 181 K/MCL (ref 140–450)
POTASSIUM SERPL-SCNC: 3.9 MMOL/L (ref 3.4–5.1)
PROT SERPL-MCNC: 6.2 G/DL (ref 6.4–8.2)
RBC # BLD: 3.89 MIL/MCL (ref 4–5.2)
SODIUM SERPL-SCNC: 139 MMOL/L (ref 135–145)
WBC # BLD: 6.4 K/MCL (ref 4.2–11)

## 2025-07-29 PROCEDURE — 99233 SBSQ HOSP IP/OBS HIGH 50: CPT | Performed by: INTERNAL MEDICINE

## 2025-07-29 PROCEDURE — 80053 COMPREHEN METABOLIC PANEL: CPT | Performed by: INTERNAL MEDICINE

## 2025-07-29 PROCEDURE — 10002800 HB RX 250 W HCPCS

## 2025-07-29 PROCEDURE — 36415 COLL VENOUS BLD VENIPUNCTURE: CPT | Performed by: INTERNAL MEDICINE

## 2025-07-29 PROCEDURE — 99232 SBSQ HOSP IP/OBS MODERATE 35: CPT | Performed by: STUDENT IN AN ORGANIZED HEALTH CARE EDUCATION/TRAINING PROGRAM

## 2025-07-29 PROCEDURE — 10002807 HB RX 258

## 2025-07-29 PROCEDURE — 99233 SBSQ HOSP IP/OBS HIGH 50: CPT | Performed by: FAMILY MEDICINE

## 2025-07-29 PROCEDURE — 10002807 HB RX 258: Performed by: INTERNAL MEDICINE

## 2025-07-29 PROCEDURE — 10002800 HB RX 250 W HCPCS: Performed by: INTERNAL MEDICINE

## 2025-07-29 PROCEDURE — 85025 COMPLETE CBC W/AUTO DIFF WBC: CPT | Performed by: INTERNAL MEDICINE

## 2025-07-29 PROCEDURE — 96372 THER/PROPH/DIAG INJ SC/IM: CPT | Performed by: INTERNAL MEDICINE

## 2025-07-29 PROCEDURE — 10000002 HB ROOM CHARGE MED SURG

## 2025-07-29 PROCEDURE — A9150 MISC/EXPER NON-PRESCRIPT DRU: HCPCS | Performed by: INTERNAL MEDICINE

## 2025-07-29 PROCEDURE — 10002803 HB RX 637: Performed by: INTERNAL MEDICINE

## 2025-07-29 RX ORDER — 0.9 % SODIUM CHLORIDE 0.9 %
10 VIAL (ML) INJECTION PRN
Status: CANCELLED | OUTPATIENT
Start: 2025-07-31

## 2025-07-29 RX ADMIN — SODIUM CHLORIDE, POTASSIUM CHLORIDE, SODIUM LACTATE AND CALCIUM CHLORIDE: 600; 310; 30; 20 INJECTION, SOLUTION INTRAVENOUS at 22:20

## 2025-07-29 RX ADMIN — ACETAMINOPHEN 325 MG: 325 TABLET ORAL at 19:39

## 2025-07-29 RX ADMIN — HEPARIN SODIUM 5000 UNITS: 5000 INJECTION INTRAVENOUS; SUBCUTANEOUS at 05:20

## 2025-07-29 RX ADMIN — AMPICILLIN SODIUM AND SULBACTAM SODIUM 3 G: 2; 1 INJECTION, POWDER, FOR SOLUTION INTRAMUSCULAR; INTRAVENOUS at 13:09

## 2025-07-29 RX ADMIN — HEPARIN SODIUM 5000 UNITS: 5000 INJECTION INTRAVENOUS; SUBCUTANEOUS at 22:21

## 2025-07-29 RX ADMIN — AMPICILLIN SODIUM AND SULBACTAM SODIUM 3 G: 2; 1 INJECTION, POWDER, FOR SOLUTION INTRAMUSCULAR; INTRAVENOUS at 17:40

## 2025-07-29 RX ADMIN — AMPICILLIN SODIUM AND SULBACTAM SODIUM 3 G: 2; 1 INJECTION, POWDER, FOR SOLUTION INTRAMUSCULAR; INTRAVENOUS at 05:20

## 2025-07-29 SDOH — ECONOMIC STABILITY: HOUSING INSECURITY: WHAT IS YOUR LIVING SITUATION TODAY?: I HAVE A STEADY PLACE TO LIVE

## 2025-07-29 ASSESSMENT — ENCOUNTER SYMPTOMS
COUGH: 0
VOMITING: 0
SHORTNESS OF BREATH: 0
HEARTBURN: 0
CHILLS: 0
ABDOMINAL PAIN: 0
SORE THROAT: 0
HALLUCINATIONS: 0
DEPRESSION: 0
TREMORS: 0
HEADACHES: 0
DIZZINESS: 0
FEVER: 0

## 2025-07-29 ASSESSMENT — COGNITIVE AND FUNCTIONAL STATUS - GENERAL
DO YOU HAVE DIFFICULTY DRESSING OR BATHING: NO
DO YOU HAVE SERIOUS DIFFICULTY WALKING OR CLIMBING STAIRS: NO
BECAUSE OF A PHYSICAL, MENTAL, OR EMOTIONAL CONDITION, DO YOU HAVE SERIOUS DIFFICULTY CONCENTRATING, REMEMBERING OR MAKING DECISIONS: NO
BECAUSE OF A PHYSICAL, MENTAL, OR EMOTIONAL CONDITION, DO YOU HAVE DIFFICULTY DOING ERRANDS ALONE: NO

## 2025-07-29 ASSESSMENT — PAIN SCALES - GENERAL
PAINLEVEL_OUTOF10: 4
PAINLEVEL_OUTOF10: 2
PAINLEVEL_OUTOF10: 0

## 2025-07-30 PROCEDURE — 13003291 HB INS MIDLINE W/GUIDE INCL CATH

## 2025-07-30 PROCEDURE — A4216 STERILE WATER/SALINE, 10 ML: HCPCS

## 2025-07-30 PROCEDURE — 10002800 HB RX 250 W HCPCS: Performed by: INTERNAL MEDICINE

## 2025-07-30 PROCEDURE — 99233 SBSQ HOSP IP/OBS HIGH 50: CPT | Performed by: FAMILY MEDICINE

## 2025-07-30 PROCEDURE — 10002800 HB RX 250 W HCPCS

## 2025-07-30 PROCEDURE — 10004281 HB COUNTER-STAFF TIME PER 15 MIN

## 2025-07-30 PROCEDURE — 96372 THER/PROPH/DIAG INJ SC/IM: CPT | Performed by: INTERNAL MEDICINE

## 2025-07-30 PROCEDURE — 10004651 HB RX, NO CHARGE ITEM

## 2025-07-30 PROCEDURE — 99232 SBSQ HOSP IP/OBS MODERATE 35: CPT | Performed by: STUDENT IN AN ORGANIZED HEALTH CARE EDUCATION/TRAINING PROGRAM

## 2025-07-30 PROCEDURE — 10002807 HB RX 258: Performed by: INTERNAL MEDICINE

## 2025-07-30 PROCEDURE — A4216 STERILE WATER/SALINE, 10 ML: HCPCS | Performed by: INTERNAL MEDICINE

## 2025-07-30 PROCEDURE — 10004651 HB RX, NO CHARGE ITEM: Performed by: INTERNAL MEDICINE

## 2025-07-30 PROCEDURE — 10002807 HB RX 258

## 2025-07-30 PROCEDURE — 10000002 HB ROOM CHARGE MED SURG

## 2025-07-30 RX ORDER — 0.9 % SODIUM CHLORIDE 0.9 %
10 VIAL (ML) INJECTION PRN
Status: DISCONTINUED | OUTPATIENT
Start: 2025-07-30 | End: 2025-07-31 | Stop reason: HOSPADM

## 2025-07-30 RX ORDER — 0.9 % SODIUM CHLORIDE 0.9 %
10 VIAL (ML) INJECTION EVERY 12 HOURS SCHEDULED
Status: DISCONTINUED | OUTPATIENT
Start: 2025-07-30 | End: 2025-07-31 | Stop reason: HOSPADM

## 2025-07-30 RX ADMIN — SODIUM CHLORIDE, POTASSIUM CHLORIDE, SODIUM LACTATE AND CALCIUM CHLORIDE: 600; 310; 30; 20 INJECTION, SOLUTION INTRAVENOUS at 23:58

## 2025-07-30 RX ADMIN — SODIUM CHLORIDE, PRESERVATIVE FREE 2 ML: 5 INJECTION INTRAVENOUS at 07:54

## 2025-07-30 RX ADMIN — HEPARIN SODIUM 5000 UNITS: 5000 INJECTION INTRAVENOUS; SUBCUTANEOUS at 14:38

## 2025-07-30 RX ADMIN — SODIUM CHLORIDE, PRESERVATIVE FREE 2 ML: 5 INJECTION INTRAVENOUS at 21:15

## 2025-07-30 RX ADMIN — SODIUM CHLORIDE, POTASSIUM CHLORIDE, SODIUM LACTATE AND CALCIUM CHLORIDE: 600; 310; 30; 20 INJECTION, SOLUTION INTRAVENOUS at 12:26

## 2025-07-30 RX ADMIN — HEPARIN SODIUM 5000 UNITS: 5000 INJECTION INTRAVENOUS; SUBCUTANEOUS at 21:10

## 2025-07-30 RX ADMIN — AMPICILLIN SODIUM AND SULBACTAM SODIUM 3 G: 2; 1 INJECTION, POWDER, FOR SOLUTION INTRAMUSCULAR; INTRAVENOUS at 23:57

## 2025-07-30 RX ADMIN — AMPICILLIN SODIUM AND SULBACTAM SODIUM 3 G: 2; 1 INJECTION, POWDER, FOR SOLUTION INTRAMUSCULAR; INTRAVENOUS at 05:59

## 2025-07-30 RX ADMIN — AMPICILLIN SODIUM AND SULBACTAM SODIUM 3 G: 2; 1 INJECTION, POWDER, FOR SOLUTION INTRAMUSCULAR; INTRAVENOUS at 00:41

## 2025-07-30 RX ADMIN — SODIUM CHLORIDE, PRESERVATIVE FREE 10 ML: 5 INJECTION INTRAVENOUS at 12:28

## 2025-07-30 RX ADMIN — SODIUM CHLORIDE, PRESERVATIVE FREE 10 ML: 5 INJECTION INTRAVENOUS at 21:15

## 2025-07-30 RX ADMIN — AMPICILLIN SODIUM AND SULBACTAM SODIUM 3 G: 2; 1 INJECTION, POWDER, FOR SOLUTION INTRAMUSCULAR; INTRAVENOUS at 12:27

## 2025-07-30 RX ADMIN — AMPICILLIN SODIUM AND SULBACTAM SODIUM 3 G: 2; 1 INJECTION, POWDER, FOR SOLUTION INTRAMUSCULAR; INTRAVENOUS at 17:53

## 2025-07-30 ASSESSMENT — PAIN SCALES - GENERAL
PAINLEVEL_OUTOF10: 0
PAINLEVEL_OUTOF10: 0

## 2025-07-30 ASSESSMENT — ENCOUNTER SYMPTOMS
HALLUCINATIONS: 0
VOMITING: 0
ABDOMINAL PAIN: 0
DIZZINESS: 0
HEARTBURN: 0
COUGH: 0
TREMORS: 0
SORE THROAT: 0
DEPRESSION: 0
HEADACHES: 0
FEVER: 0
SHORTNESS OF BREATH: 0
CHILLS: 0

## 2025-07-31 ENCOUNTER — CLINICAL ABSTRACT (OUTPATIENT)
Dept: INFUSION THERAPY | Age: 70
End: 2025-07-31

## 2025-07-31 VITALS
TEMPERATURE: 98.1 F | OXYGEN SATURATION: 97 % | HEIGHT: 68 IN | DIASTOLIC BLOOD PRESSURE: 88 MMHG | WEIGHT: 213.41 LBS | SYSTOLIC BLOOD PRESSURE: 133 MMHG | RESPIRATION RATE: 16 BRPM | HEART RATE: 63 BPM | BODY MASS INDEX: 32.34 KG/M2

## 2025-07-31 PROCEDURE — 10004651 HB RX, NO CHARGE ITEM: Performed by: INTERNAL MEDICINE

## 2025-07-31 PROCEDURE — 10002800 HB RX 250 W HCPCS

## 2025-07-31 PROCEDURE — 10002807 HB RX 258

## 2025-07-31 PROCEDURE — 10004651 HB RX, NO CHARGE ITEM

## 2025-07-31 PROCEDURE — 96372 THER/PROPH/DIAG INJ SC/IM: CPT | Performed by: INTERNAL MEDICINE

## 2025-07-31 PROCEDURE — A4216 STERILE WATER/SALINE, 10 ML: HCPCS

## 2025-07-31 PROCEDURE — 10002800 HB RX 250 W HCPCS: Performed by: INTERNAL MEDICINE

## 2025-07-31 PROCEDURE — A4216 STERILE WATER/SALINE, 10 ML: HCPCS | Performed by: INTERNAL MEDICINE

## 2025-07-31 PROCEDURE — 99232 SBSQ HOSP IP/OBS MODERATE 35: CPT | Performed by: STUDENT IN AN ORGANIZED HEALTH CARE EDUCATION/TRAINING PROGRAM

## 2025-07-31 PROCEDURE — 99239 HOSP IP/OBS DSCHRG MGMT >30: CPT | Performed by: FAMILY MEDICINE

## 2025-07-31 RX ADMIN — ERTAPENEM SODIUM 1 G: 1 INJECTION, POWDER, LYOPHILIZED, FOR SOLUTION INTRAMUSCULAR; INTRAVENOUS at 15:00

## 2025-07-31 RX ADMIN — HEPARIN SODIUM 5000 UNITS: 5000 INJECTION INTRAVENOUS; SUBCUTANEOUS at 05:52

## 2025-07-31 RX ADMIN — SODIUM CHLORIDE, PRESERVATIVE FREE 10 ML: 5 INJECTION INTRAVENOUS at 09:23

## 2025-07-31 RX ADMIN — AMPICILLIN SODIUM AND SULBACTAM SODIUM 3 G: 2; 1 INJECTION, POWDER, FOR SOLUTION INTRAMUSCULAR; INTRAVENOUS at 05:52

## 2025-07-31 RX ADMIN — SODIUM CHLORIDE, PRESERVATIVE FREE 2 ML: 5 INJECTION INTRAVENOUS at 09:23

## 2025-07-31 ASSESSMENT — ENCOUNTER SYMPTOMS
TREMORS: 0
FEVER: 0
HEADACHES: 0
DEPRESSION: 0
HEARTBURN: 0
DIZZINESS: 0
SORE THROAT: 0
ABDOMINAL PAIN: 0
CHILLS: 0
SHORTNESS OF BREATH: 0
COUGH: 0
VOMITING: 0
HALLUCINATIONS: 0

## 2025-07-31 ASSESSMENT — PAIN SCALES - GENERAL: PAINLEVEL_OUTOF10: 0

## 2025-08-01 ENCOUNTER — HOSPITAL ENCOUNTER (OUTPATIENT)
Dept: INFUSION THERAPY | Age: 70
Discharge: STILL ADMITTED OR ADMINISTRATIVE | End: 2025-08-01

## 2025-08-01 VITALS
HEART RATE: 81 BPM | OXYGEN SATURATION: 96 % | DIASTOLIC BLOOD PRESSURE: 73 MMHG | WEIGHT: 217.48 LBS | RESPIRATION RATE: 18 BRPM | SYSTOLIC BLOOD PRESSURE: 128 MMHG | BODY MASS INDEX: 33.07 KG/M2 | TEMPERATURE: 97.6 F

## 2025-08-01 DIAGNOSIS — K57.20 DIVERTICULITIS OF LARGE INTESTINE WITH PERFORATION WITHOUT BLEEDING: Primary | ICD-10-CM

## 2025-08-01 PROCEDURE — 10004651 HB RX, NO CHARGE ITEM

## 2025-08-01 PROCEDURE — 10002807 HB RX 258: Performed by: INTERNAL MEDICINE

## 2025-08-01 PROCEDURE — 96365 THER/PROPH/DIAG IV INF INIT: CPT

## 2025-08-01 PROCEDURE — 10002807 HB RX 258

## 2025-08-01 PROCEDURE — A4216 STERILE WATER/SALINE, 10 ML: HCPCS

## 2025-08-01 PROCEDURE — 10002800 HB RX 250 W HCPCS: Performed by: INTERNAL MEDICINE

## 2025-08-01 RX ORDER — 0.9 % SODIUM CHLORIDE 0.9 %
10 VIAL (ML) INJECTION PRN
Status: CANCELLED | OUTPATIENT
Start: 2025-08-02

## 2025-08-01 RX ORDER — 0.9 % SODIUM CHLORIDE 0.9 %
10 VIAL (ML) INJECTION PRN
Status: DISCONTINUED | OUTPATIENT
Start: 2025-08-01 | End: 2025-08-03 | Stop reason: HOSPADM

## 2025-08-01 RX ADMIN — ERTAPENEM SODIUM 1 G: 1 INJECTION, POWDER, LYOPHILIZED, FOR SOLUTION INTRAMUSCULAR; INTRAVENOUS at 14:49

## 2025-08-01 RX ADMIN — SODIUM CHLORIDE 25 ML: 9 INJECTION, SOLUTION INTRAVENOUS at 14:48

## 2025-08-01 RX ADMIN — SODIUM CHLORIDE 10 ML: 9 INJECTION, SOLUTION INTRAMUSCULAR; INTRAVENOUS; SUBCUTANEOUS at 14:48

## 2025-08-01 ASSESSMENT — PAIN SCALES - GENERAL: PAINLEVEL_OUTOF10: 0

## 2025-08-02 ENCOUNTER — NURSE TRIAGE (OUTPATIENT)
Dept: TELEHEALTH | Age: 70
End: 2025-08-02

## 2025-08-02 ENCOUNTER — HOSPITAL ENCOUNTER (OUTPATIENT)
Dept: INFUSION THERAPY | Age: 70
Discharge: HOME | End: 2025-08-02

## 2025-08-02 VITALS
RESPIRATION RATE: 16 BRPM | OXYGEN SATURATION: 97 % | DIASTOLIC BLOOD PRESSURE: 66 MMHG | TEMPERATURE: 97.9 F | SYSTOLIC BLOOD PRESSURE: 113 MMHG | HEART RATE: 80 BPM

## 2025-08-02 DIAGNOSIS — K57.20 DIVERTICULITIS OF LARGE INTESTINE WITH PERFORATION WITHOUT BLEEDING: Primary | ICD-10-CM

## 2025-08-02 LAB
BACTERIA BLD CULT: ABNORMAL
BACTERIA BLD CULT: NORMAL
GRAM STN SPEC: ABNORMAL

## 2025-08-02 PROCEDURE — 96365 THER/PROPH/DIAG IV INF INIT: CPT

## 2025-08-02 PROCEDURE — A4216 STERILE WATER/SALINE, 10 ML: HCPCS

## 2025-08-02 PROCEDURE — 10004651 HB RX, NO CHARGE ITEM

## 2025-08-02 PROCEDURE — 10002800 HB RX 250 W HCPCS: Performed by: INTERNAL MEDICINE

## 2025-08-02 PROCEDURE — 10002807 HB RX 258: Performed by: INTERNAL MEDICINE

## 2025-08-02 PROCEDURE — 10002807 HB RX 258

## 2025-08-02 RX ORDER — 0.9 % SODIUM CHLORIDE 0.9 %
10 VIAL (ML) INJECTION PRN
Status: CANCELLED | OUTPATIENT
Start: 2025-08-03

## 2025-08-02 RX ORDER — 0.9 % SODIUM CHLORIDE 0.9 %
10 VIAL (ML) INJECTION PRN
Status: ACTIVE | OUTPATIENT
Start: 2025-08-02

## 2025-08-02 RX ADMIN — SODIUM CHLORIDE 25 ML: 9 INJECTION, SOLUTION INTRAVENOUS at 12:56

## 2025-08-02 RX ADMIN — SODIUM CHLORIDE, PRESERVATIVE FREE 10 ML: 5 INJECTION INTRAVENOUS at 13:30

## 2025-08-02 RX ADMIN — SODIUM CHLORIDE, PRESERVATIVE FREE 10 ML: 5 INJECTION INTRAVENOUS at 12:54

## 2025-08-02 RX ADMIN — ERTAPENEM SODIUM 1 G: 1 INJECTION, POWDER, LYOPHILIZED, FOR SOLUTION INTRAMUSCULAR; INTRAVENOUS at 12:57

## 2025-08-02 ASSESSMENT — PAIN SCALES - GENERAL: PAINLEVEL_OUTOF10: 0

## 2025-08-03 ENCOUNTER — HOSPITAL ENCOUNTER (OUTPATIENT)
Dept: INFUSION THERAPY | Age: 70
Discharge: HOME | End: 2025-08-03

## 2025-08-03 VITALS
HEART RATE: 61 BPM | SYSTOLIC BLOOD PRESSURE: 108 MMHG | DIASTOLIC BLOOD PRESSURE: 72 MMHG | TEMPERATURE: 97.6 F | OXYGEN SATURATION: 95 % | RESPIRATION RATE: 16 BRPM

## 2025-08-03 DIAGNOSIS — K57.20 DIVERTICULITIS OF LARGE INTESTINE WITH PERFORATION WITHOUT BLEEDING: Primary | ICD-10-CM

## 2025-08-03 PROCEDURE — 10004651 HB RX, NO CHARGE ITEM

## 2025-08-03 PROCEDURE — 10002807 HB RX 258: Performed by: INTERNAL MEDICINE

## 2025-08-03 PROCEDURE — A4216 STERILE WATER/SALINE, 10 ML: HCPCS

## 2025-08-03 PROCEDURE — 10002807 HB RX 258

## 2025-08-03 PROCEDURE — 96365 THER/PROPH/DIAG IV INF INIT: CPT

## 2025-08-03 PROCEDURE — 10002800 HB RX 250 W HCPCS: Performed by: INTERNAL MEDICINE

## 2025-08-03 RX ORDER — 0.9 % SODIUM CHLORIDE 0.9 %
10 VIAL (ML) INJECTION PRN
OUTPATIENT
Start: 2025-08-04

## 2025-08-03 RX ORDER — 0.9 % SODIUM CHLORIDE 0.9 %
10 VIAL (ML) INJECTION PRN
Status: ACTIVE | OUTPATIENT
Start: 2025-08-03

## 2025-08-03 RX ADMIN — ERTAPENEM SODIUM 1 G: 1 INJECTION, POWDER, LYOPHILIZED, FOR SOLUTION INTRAMUSCULAR; INTRAVENOUS at 09:19

## 2025-08-03 RX ADMIN — SODIUM CHLORIDE, PRESERVATIVE FREE 10 ML: 5 INJECTION INTRAVENOUS at 09:18

## 2025-08-03 RX ADMIN — SODIUM CHLORIDE 25 ML: 9 INJECTION, SOLUTION INTRAVENOUS at 09:18

## 2025-08-04 ENCOUNTER — APPOINTMENT (OUTPATIENT)
Dept: INFUSION THERAPY | Age: 70
End: 2025-08-04

## 2025-08-04 ENCOUNTER — APPOINTMENT (OUTPATIENT)
Dept: FAMILY MEDICINE | Age: 70
End: 2025-08-04

## 2025-08-04 VITALS
RESPIRATION RATE: 16 BRPM | WEIGHT: 216 LBS | DIASTOLIC BLOOD PRESSURE: 66 MMHG | SYSTOLIC BLOOD PRESSURE: 116 MMHG | TEMPERATURE: 98.5 F | HEART RATE: 78 BPM | HEIGHT: 67 IN | BODY MASS INDEX: 33.9 KG/M2

## 2025-08-04 VITALS
TEMPERATURE: 97.5 F | OXYGEN SATURATION: 95 % | RESPIRATION RATE: 16 BRPM | DIASTOLIC BLOOD PRESSURE: 73 MMHG | HEART RATE: 72 BPM | SYSTOLIC BLOOD PRESSURE: 108 MMHG

## 2025-08-04 DIAGNOSIS — E66.812 CLASS 2 OBESITY DUE TO EXCESS CALORIES WITHOUT SERIOUS COMORBIDITY WITH BODY MASS INDEX (BMI) OF 39.0 TO 39.9 IN ADULT: ICD-10-CM

## 2025-08-04 DIAGNOSIS — Z09 HOSPITAL DISCHARGE FOLLOW-UP: ICD-10-CM

## 2025-08-04 DIAGNOSIS — K57.20 DIVERTICULITIS OF LARGE INTESTINE WITH PERFORATION WITHOUT BLEEDING: Primary | ICD-10-CM

## 2025-08-04 DIAGNOSIS — E66.09 CLASS 2 OBESITY DUE TO EXCESS CALORIES WITHOUT SERIOUS COMORBIDITY WITH BODY MASS INDEX (BMI) OF 39.0 TO 39.9 IN ADULT: ICD-10-CM

## 2025-08-04 DIAGNOSIS — I10 ESSENTIAL HYPERTENSION: ICD-10-CM

## 2025-08-04 DIAGNOSIS — E66.812 CLASS 2 SEVERE OBESITY WITH BODY MASS INDEX (BMI) OF 35 TO 39.9 WITH SERIOUS COMORBIDITY (CMD): ICD-10-CM

## 2025-08-04 DIAGNOSIS — E66.01 CLASS 2 SEVERE OBESITY WITH BODY MASS INDEX (BMI) OF 35 TO 39.9 WITH SERIOUS COMORBIDITY (CMD): ICD-10-CM

## 2025-08-04 LAB
ALBUMIN SERPL-MCNC: 3 G/DL (ref 3.4–5)
ALBUMIN/GLOB SERPL: 0.8 {RATIO} (ref 1–2.4)
ALP SERPL-CCNC: 91 UNITS/L (ref 45–117)
ALT SERPL-CCNC: 14 UNITS/L
ANION GAP SERPL CALC-SCNC: 3 MMOL/L (ref 7–19)
AST SERPL-CCNC: 15 UNITS/L
BACTERIA BLD CULT: ABNORMAL
BASOPHILS # BLD: 0 K/MCL (ref 0–0.3)
BASOPHILS NFR BLD: 1 %
BILIRUB SERPL-MCNC: 0.3 MG/DL (ref 0.2–1)
BUN SERPL-MCNC: 14 MG/DL (ref 6–20)
BUN/CREAT SERPL: 15 (ref 7–25)
CALCIUM SERPL-MCNC: 10.9 MG/DL (ref 8.4–10.2)
CHLORIDE SERPL-SCNC: 109 MMOL/L (ref 97–110)
CO2 SERPL-SCNC: 31 MMOL/L (ref 21–32)
CREAT SERPL-MCNC: 0.95 MG/DL (ref 0.51–0.95)
DEPRECATED RDW RBC: 40.5 FL (ref 39–50)
EGFRCR SERPLBLD CKD-EPI 2021: 65 ML/MIN/{1.73_M2}
EOSINOPHIL # BLD: 0.2 K/MCL (ref 0–0.5)
EOSINOPHIL NFR BLD: 3 %
ERYTHROCYTE [DISTWIDTH] IN BLOOD: 12.1 % (ref 11–15)
FASTING DURATION TIME PATIENT: ABNORMAL H
GLOBULIN SER-MCNC: 4 G/DL (ref 2–4)
GLUCOSE SERPL-MCNC: 121 MG/DL (ref 70–99)
GRAM STN SPEC: ABNORMAL
HCT VFR BLD CALC: 37.8 % (ref 36–46.5)
HGB BLD-MCNC: 12.5 G/DL (ref 12–15.5)
IMM GRANULOCYTES # BLD AUTO: 0 K/MCL (ref 0–0.2)
IMM GRANULOCYTES # BLD: 0 %
LYMPHOCYTES # BLD: 2.4 K/MCL (ref 1–4)
LYMPHOCYTES NFR BLD: 33 %
MCH RBC QN AUTO: 30.1 PG (ref 26–34)
MCHC RBC AUTO-ENTMCNC: 33.1 G/DL (ref 32–36.5)
MCV RBC AUTO: 91.1 FL (ref 78–100)
MONOCYTES # BLD: 0.6 K/MCL (ref 0.3–0.9)
MONOCYTES NFR BLD: 8 %
NEUTROPHILS # BLD: 4.1 K/MCL (ref 1.8–7.7)
NEUTROPHILS NFR BLD: 55 %
NRBC BLD MANUAL-RTO: 0 /100 WBC
PLATELET # BLD AUTO: 264 K/MCL (ref 140–450)
POTASSIUM SERPL-SCNC: 3.4 MMOL/L (ref 3.4–5.1)
PROT SERPL-MCNC: 7 G/DL (ref 6.4–8.2)
RBC # BLD: 4.15 MIL/MCL (ref 4–5.2)
SODIUM SERPL-SCNC: 140 MMOL/L (ref 135–145)
WBC # BLD: 7.4 K/MCL (ref 4.2–11)

## 2025-08-04 PROCEDURE — 10002807 HB RX 258: Performed by: INTERNAL MEDICINE

## 2025-08-04 PROCEDURE — 10002807 HB RX 258

## 2025-08-04 PROCEDURE — 80053 COMPREHEN METABOLIC PANEL: CPT

## 2025-08-04 PROCEDURE — A4216 STERILE WATER/SALINE, 10 ML: HCPCS

## 2025-08-04 PROCEDURE — 85025 COMPLETE CBC W/AUTO DIFF WBC: CPT

## 2025-08-04 PROCEDURE — 96365 THER/PROPH/DIAG IV INF INIT: CPT

## 2025-08-04 PROCEDURE — 99214 OFFICE O/P EST MOD 30 MIN: CPT | Performed by: FAMILY MEDICINE

## 2025-08-04 PROCEDURE — 36592 COLLECT BLOOD FROM PICC: CPT

## 2025-08-04 PROCEDURE — 10002800 HB RX 250 W HCPCS: Performed by: INTERNAL MEDICINE

## 2025-08-04 PROCEDURE — 10004651 HB RX, NO CHARGE ITEM

## 2025-08-04 RX ORDER — 0.9 % SODIUM CHLORIDE 0.9 %
10 VIAL (ML) INJECTION PRN
Status: DISCONTINUED | OUTPATIENT
Start: 2025-08-04 | End: 2025-08-06 | Stop reason: HOSPADM

## 2025-08-04 RX ORDER — 0.9 % SODIUM CHLORIDE 0.9 %
10 VIAL (ML) INJECTION PRN
Status: CANCELLED | OUTPATIENT
Start: 2025-08-11

## 2025-08-04 RX ADMIN — SODIUM CHLORIDE 10 ML: 9 INJECTION, SOLUTION INTRAMUSCULAR; INTRAVENOUS; SUBCUTANEOUS at 15:40

## 2025-08-04 RX ADMIN — SODIUM CHLORIDE 25 ML: 9 INJECTION, SOLUTION INTRAVENOUS at 14:58

## 2025-08-04 RX ADMIN — ERTAPENEM SODIUM 1 G: 1 INJECTION, POWDER, LYOPHILIZED, FOR SOLUTION INTRAMUSCULAR; INTRAVENOUS at 14:58

## 2025-08-04 ASSESSMENT — ENCOUNTER SYMPTOMS
CONSTITUTIONAL NEGATIVE: 1
RESPIRATORY NEGATIVE: 1
ALLERGIC/IMMUNOLOGIC NEGATIVE: 1
VOMITING: 0
ABDOMINAL PAIN: 0
ENDOCRINE NEGATIVE: 1
EYES NEGATIVE: 1
PSYCHIATRIC NEGATIVE: 1
GASTROINTESTINAL NEGATIVE: 1
HEMATOLOGIC/LYMPHATIC NEGATIVE: 1
ABDOMINAL DISTENTION: 0
DIARRHEA: 0
NAUSEA: 0
NEUROLOGICAL NEGATIVE: 1

## 2025-08-04 ASSESSMENT — PAIN SCALES - GENERAL: PAINLEVEL_OUTOF10: 0

## 2025-08-04 ASSESSMENT — PATIENT HEALTH QUESTIONNAIRE - PHQ9
1. LITTLE INTEREST OR PLEASURE IN DOING THINGS: NOT AT ALL
SUM OF ALL RESPONSES TO PHQ9 QUESTIONS 1 AND 2: 0
2. FEELING DOWN, DEPRESSED OR HOPELESS: NOT AT ALL
SUM OF ALL RESPONSES TO PHQ9 QUESTIONS 1 AND 2: 0
CLINICAL INTERPRETATION OF PHQ2 SCORE: NO FURTHER SCREENING NEEDED

## 2025-08-05 ENCOUNTER — APPOINTMENT (OUTPATIENT)
Dept: INFUSION THERAPY | Age: 70
End: 2025-08-05

## 2025-08-05 VITALS
SYSTOLIC BLOOD PRESSURE: 122 MMHG | OXYGEN SATURATION: 97 % | TEMPERATURE: 98 F | DIASTOLIC BLOOD PRESSURE: 73 MMHG | RESPIRATION RATE: 16 BRPM | HEART RATE: 61 BPM

## 2025-08-05 DIAGNOSIS — K57.20 DIVERTICULITIS OF LARGE INTESTINE WITH PERFORATION WITHOUT BLEEDING: Primary | ICD-10-CM

## 2025-08-05 PROCEDURE — 96365 THER/PROPH/DIAG IV INF INIT: CPT

## 2025-08-05 PROCEDURE — 10002800 HB RX 250 W HCPCS: Performed by: INTERNAL MEDICINE

## 2025-08-05 PROCEDURE — 10002807 HB RX 258: Performed by: INTERNAL MEDICINE

## 2025-08-05 RX ORDER — 0.9 % SODIUM CHLORIDE 0.9 %
10 VIAL (ML) INJECTION PRN
Status: DISCONTINUED | OUTPATIENT
Start: 2025-08-05 | End: 2025-08-07 | Stop reason: HOSPADM

## 2025-08-05 RX ORDER — 0.9 % SODIUM CHLORIDE 0.9 %
10 VIAL (ML) INJECTION PRN
Status: CANCELLED | OUTPATIENT
Start: 2025-08-11

## 2025-08-05 RX ADMIN — ERTAPENEM SODIUM 1 G: 1 INJECTION, POWDER, LYOPHILIZED, FOR SOLUTION INTRAMUSCULAR; INTRAVENOUS at 14:35

## 2025-08-06 ENCOUNTER — APPOINTMENT (OUTPATIENT)
Dept: INFUSION THERAPY | Age: 70
End: 2025-08-06

## 2025-08-06 VITALS
SYSTOLIC BLOOD PRESSURE: 137 MMHG | TEMPERATURE: 98 F | DIASTOLIC BLOOD PRESSURE: 85 MMHG | HEART RATE: 73 BPM | RESPIRATION RATE: 16 BRPM | OXYGEN SATURATION: 97 %

## 2025-08-06 DIAGNOSIS — K57.92 ACUTE DIVERTICULITIS: Primary | ICD-10-CM

## 2025-08-06 DIAGNOSIS — K57.20 DIVERTICULITIS OF LARGE INTESTINE WITH PERFORATION WITHOUT BLEEDING: Primary | ICD-10-CM

## 2025-08-06 PROCEDURE — 10002800 HB RX 250 W HCPCS: Performed by: INTERNAL MEDICINE

## 2025-08-06 PROCEDURE — 10002807 HB RX 258: Performed by: INTERNAL MEDICINE

## 2025-08-06 PROCEDURE — 10002807 HB RX 258

## 2025-08-06 PROCEDURE — A4216 STERILE WATER/SALINE, 10 ML: HCPCS

## 2025-08-06 PROCEDURE — 10004651 HB RX, NO CHARGE ITEM

## 2025-08-06 PROCEDURE — 96365 THER/PROPH/DIAG IV INF INIT: CPT

## 2025-08-06 RX ORDER — 0.9 % SODIUM CHLORIDE 0.9 %
10 VIAL (ML) INJECTION PRN
Status: CANCELLED | OUTPATIENT
Start: 2025-08-11

## 2025-08-06 RX ORDER — 0.9 % SODIUM CHLORIDE 0.9 %
10 VIAL (ML) INJECTION PRN
Status: DISCONTINUED | OUTPATIENT
Start: 2025-08-06 | End: 2025-08-08 | Stop reason: HOSPADM

## 2025-08-06 RX ADMIN — ERTAPENEM SODIUM 1 G: 1 INJECTION, POWDER, LYOPHILIZED, FOR SOLUTION INTRAMUSCULAR; INTRAVENOUS at 14:14

## 2025-08-06 RX ADMIN — SODIUM CHLORIDE, PRESERVATIVE FREE 10 ML: 5 INJECTION INTRAVENOUS at 14:02

## 2025-08-06 RX ADMIN — SODIUM CHLORIDE 25 ML: 9 INJECTION, SOLUTION INTRAVENOUS at 14:13

## 2025-08-06 ASSESSMENT — PAIN SCALES - GENERAL
PAINLEVEL_OUTOF10: 0
PAINLEVEL_OUTOF10: 0

## 2025-08-07 ENCOUNTER — APPOINTMENT (OUTPATIENT)
Dept: INFUSION THERAPY | Age: 70
End: 2025-08-07

## 2025-08-07 ENCOUNTER — HOSPITAL ENCOUNTER (OUTPATIENT)
Dept: CT IMAGING | Age: 70
Discharge: HOME | End: 2025-08-07
Attending: INTERNAL MEDICINE

## 2025-08-07 ENCOUNTER — CLINICAL ABSTRACT (OUTPATIENT)
Dept: INFUSION THERAPY | Age: 70
End: 2025-08-07

## 2025-08-07 VITALS
HEART RATE: 65 BPM | RESPIRATION RATE: 16 BRPM | OXYGEN SATURATION: 96 % | DIASTOLIC BLOOD PRESSURE: 75 MMHG | SYSTOLIC BLOOD PRESSURE: 117 MMHG | TEMPERATURE: 97.9 F

## 2025-08-07 DIAGNOSIS — K57.20 DIVERTICULITIS OF LARGE INTESTINE WITH PERFORATION WITHOUT BLEEDING: Primary | ICD-10-CM

## 2025-08-07 DIAGNOSIS — K57.92 ACUTE DIVERTICULITIS: ICD-10-CM

## 2025-08-07 PROCEDURE — 74177 CT ABD & PELVIS W/CONTRAST: CPT

## 2025-08-07 PROCEDURE — 96365 THER/PROPH/DIAG IV INF INIT: CPT

## 2025-08-07 PROCEDURE — 10002807 HB RX 258: Performed by: INTERNAL MEDICINE

## 2025-08-07 PROCEDURE — 10004651 HB RX, NO CHARGE ITEM

## 2025-08-07 PROCEDURE — 10002800 HB RX 250 W HCPCS: Performed by: INTERNAL MEDICINE

## 2025-08-07 PROCEDURE — 10002805 HB CONTRAST AGENT: Performed by: INTERNAL MEDICINE

## 2025-08-07 PROCEDURE — A4216 STERILE WATER/SALINE, 10 ML: HCPCS

## 2025-08-07 PROCEDURE — 10002807 HB RX 258

## 2025-08-07 RX ORDER — 0.9 % SODIUM CHLORIDE 0.9 %
10 VIAL (ML) INJECTION PRN
Status: CANCELLED | OUTPATIENT
Start: 2025-08-11

## 2025-08-07 RX ORDER — 0.9 % SODIUM CHLORIDE 0.9 %
10 VIAL (ML) INJECTION PRN
Status: DISCONTINUED | OUTPATIENT
Start: 2025-08-07 | End: 2025-08-08

## 2025-08-07 RX ADMIN — SODIUM CHLORIDE 25 ML: 9 INJECTION, SOLUTION INTRAVENOUS at 14:20

## 2025-08-07 RX ADMIN — ERTAPENEM SODIUM 1 G: 1 INJECTION, POWDER, LYOPHILIZED, FOR SOLUTION INTRAMUSCULAR; INTRAVENOUS at 14:22

## 2025-08-07 RX ADMIN — SODIUM CHLORIDE 10 ML: 9 INJECTION, SOLUTION INTRAMUSCULAR; INTRAVENOUS; SUBCUTANEOUS at 14:22

## 2025-08-07 RX ADMIN — IOHEXOL 85 ML: 350 INJECTION, SOLUTION INTRAVENOUS at 17:23

## 2025-08-07 ASSESSMENT — PAIN SCALES - GENERAL: PAINLEVEL_OUTOF10: 0

## 2025-08-08 ENCOUNTER — APPOINTMENT (OUTPATIENT)
Dept: INFUSION THERAPY | Age: 70
End: 2025-08-08

## 2025-08-08 DIAGNOSIS — K57.20 DIVERTICULITIS OF LARGE INTESTINE WITH PERFORATION WITHOUT BLEEDING: Primary | ICD-10-CM

## 2025-08-08 PROCEDURE — 99211 OFF/OP EST MAY X REQ PHY/QHP: CPT

## 2025-08-08 RX ORDER — 0.9 % SODIUM CHLORIDE 0.9 %
10 VIAL (ML) INJECTION PRN
Status: CANCELLED | OUTPATIENT
Start: 2025-08-11

## 2025-08-09 ENCOUNTER — APPOINTMENT (OUTPATIENT)
Dept: INFUSION THERAPY | Age: 70
End: 2025-08-09

## 2025-08-10 ENCOUNTER — APPOINTMENT (OUTPATIENT)
Dept: INFUSION THERAPY | Age: 70
End: 2025-08-10

## 2025-08-11 ENCOUNTER — APPOINTMENT (OUTPATIENT)
Dept: INFUSION THERAPY | Age: 70
End: 2025-08-11

## 2025-08-12 ENCOUNTER — CASE MANAGEMENT (OUTPATIENT)
Dept: CARE COORDINATION | Age: 70
End: 2025-08-12

## 2025-08-18 ENCOUNTER — CASE MANAGEMENT (OUTPATIENT)
Dept: CARE COORDINATION | Age: 70
End: 2025-08-18

## 2025-09-02 ENCOUNTER — APPOINTMENT (OUTPATIENT)
Age: 70
End: 2025-09-02

## 2025-09-02 ASSESSMENT — ENCOUNTER SYMPTOMS
ACTIVITY CHANGE: 0
FEVER: 0
APPETITE CHANGE: 0
COLOR CHANGE: 0
CHEST TIGHTNESS: 0
SLEEP DISTURBANCE: 0
CONFUSION: 0
LIGHT-HEADEDNESS: 0
COUGH: 0
DIZZINESS: 0
FATIGUE: 0

## 2025-09-02 ASSESSMENT — PAIN SCALES - GENERAL: PAINLEVEL_OUTOF10: 0

## (undated) NOTE — LETTER
03/14/18        [address] [address]      Dear Lilliam Rodarte,    Our records indicate that you have outstanding lab work and or testing that was ordered for you and has not yet been completed:          CBC, Platelet, No Differential [

## (undated) NOTE — LETTER
02/07/19        [place]      Dear Martita Lin,    Our records indicate that you have outstanding lab work and or testing that was ordered for you and has not yet been completed:  Orders Placed This Encounter